# Patient Record
Sex: FEMALE | Race: WHITE | NOT HISPANIC OR LATINO
[De-identification: names, ages, dates, MRNs, and addresses within clinical notes are randomized per-mention and may not be internally consistent; named-entity substitution may affect disease eponyms.]

---

## 2021-09-25 ENCOUNTER — TRANSCRIPTION ENCOUNTER (OUTPATIENT)
Age: 56
End: 2021-09-25

## 2022-04-25 ENCOUNTER — APPOINTMENT (OUTPATIENT)
Dept: ORTHOPEDIC SURGERY | Facility: CLINIC | Age: 57
End: 2022-04-25

## 2022-05-12 ENCOUNTER — APPOINTMENT (OUTPATIENT)
Dept: ORTHOPEDIC SURGERY | Facility: CLINIC | Age: 57
End: 2022-05-12
Payer: OTHER MISCELLANEOUS

## 2022-05-12 VITALS — WEIGHT: 155 LBS | HEIGHT: 61 IN | BODY MASS INDEX: 29.27 KG/M2

## 2022-05-12 DIAGNOSIS — Z78.9 OTHER SPECIFIED HEALTH STATUS: ICD-10-CM

## 2022-05-12 PROCEDURE — 99455 WORK RELATED DISABILITY EXAM: CPT

## 2022-05-12 PROCEDURE — 99072 ADDL SUPL MATRL&STAF TM PHE: CPT

## 2022-05-16 NOTE — WORK
[Has the patient reached Maximum Medical Improvement? If yes, indicate date___] : Yes, on [unfilled] [Is there permanent partial impairment?] : Yes [FreeTextEntry6] : right middle finger\par right hand [Yes] : Yes [Right] : right [No ___________] : No: [unfilled] [FreeTextEntry7] : middle finger [FreeTextEntry8] : full [de-identified] : limited (post op) [de-identified] : trigger finger [FreeTextEntry5] : 20 [de-identified] : loss of strength- this is in addition to and separate from prior loss of use [de-identified] : hand [de-identified] : normal [de-identified] : normal [de-identified] : n/a [de-identified] : 0 [de-identified] : patient has prior loss- the carpal tunnel syndrome is included in that [Has the patient had an injury/illness since the date of injury which impacts residual functional capacity?] : No [Would the patient benefit from vocational rehabilitation? If Yes, explain below.] :  No

## 2022-05-16 NOTE — HISTORY OF PRESENT ILLNESS
[4] : 4 [3] : 3 [Intermittent] : intermittent [Rest] : rest [de-identified] :   DOI 12/28/20 RT HAND\par DOS 2/25/22 L MF TFR\par DOS 4/23/21 RT MF TF RELEASE\par \par 5/12/22: pain in B hands, would like  more OT for the LMF TF also here for SLU Right hand and R middle finger. \par \par 3/8/22: doing well post op L MF TF- better than the right post op\par 2/14/22: R MF Improving\par 10/21/21: left mf tf symptoms returned\par 10/19/2021: Pt here for f/u s/p right middle finger trigger release. Pt states that she still has mild discomfort to the\par right hand middle finger flexor tendon with no triggering noted. \par 5/04/21: POV 1- pt states her finger is stiff and is having discomfort. denies fevers/chills. \par 2/11/21: pt states the injection only helped for 2 days. smyptoms same. \par 1/12/21: Pt is unable to make a full fist. Pain with certain motions. \par 12/28/20: Pt injured her right hand at work today. [FreeTextEntry1] : right hand middle finger  [de-identified] : motion

## 2022-05-16 NOTE — IMAGING
[de-identified] : right hand- well healed scars from CTR and MF TFR\par farom, mild soreness with middle finger flexion\par NVI\par strength 5/5 throughout except  4+/5\par \par left hand well healed scar, mild stiffness of MF flexion\par NVI\par

## 2022-05-26 ENCOUNTER — APPOINTMENT (OUTPATIENT)
Dept: ORTHOPEDIC SURGERY | Facility: CLINIC | Age: 57
End: 2022-05-26

## 2022-05-26 PROCEDURE — 99072 ADDL SUPL MATRL&STAF TM PHE: CPT

## 2022-05-26 PROCEDURE — 99455 WORK RELATED DISABILITY EXAM: CPT

## 2022-05-26 NOTE — PHYSICAL EXAM
[de-identified] : Left hand MF:\par Thickening of palmar fascia at incision site\par TTP at incision site\par FAROM\par NVID\par \par Right hand MF:\par No swelling/ecchymosis\par TTP at incision site\par FAROM\par NVID

## 2022-05-26 NOTE — IMAGING
[de-identified] : right hand- well healed scars from CTR and MF TFR\par farom, mild soreness with middle finger flexion\par NVI\par strength 5/5 throughout except  4+/5\par \par left hand well healed scar, mild stiffness of MF flexion\par NVI\par

## 2022-05-26 NOTE — HISTORY OF PRESENT ILLNESS
[de-identified] : [facility]U \par WC DOI 12/28/20 RT HAND\par DOS 2/25/22 L MF TFR\par DOS 4/23/21 RT MF TF RELEASE\par \par 5/26/22:  Pt is still having pain in b/l MFs\par \par 5/12/22: pain in B hands, would like  more OT for the LMF TF also here for SLU Right hand and R middle finger. \par \par 3/8/22: doing well post op L MF TF- better than the right post op\par 2/14/22: R MF Improving\par 10/21/21: left mf tf symptoms returned\par 10/19/2021: Pt here for f/u s/p right middle finger trigger release. Pt states that she still has mild discomfort to the\par right hand middle finger flexor tendon with no triggering noted. \par 5/04/21: POV 1- pt states her finger is stiff and is having discomfort. denies fevers/chills. \par 2/11/21: pt states the injection only helped for 2 days. smyptoms same. \par 1/12/21: Pt is unable to make a full fist. Pain with certain motions. \par 12/28/20: Pt injured her right hand at work today. [6] : 6 [2] : 2 [Intermittent] : intermittent [Rest] : rest [FreeTextEntry1] : right hand middle finger  [de-identified] : motion  intact

## 2022-05-26 NOTE — WORK
[Has the patient reached Maximum Medical Improvement? If yes, indicate date___] : Yes, on [unfilled] [Is there permanent partial impairment?] : Yes [FreeTextEntry6] : right middle finger\par right hand [Yes] : Yes [Right] : right [No ___________] : No: [unfilled] [FreeTextEntry7] : middle finger [FreeTextEntry8] : full [de-identified] : limited (post op) [de-identified] : trigger finger [FreeTextEntry5] : 20 [de-identified] : loss of strength- this is in addition to and separate from prior loss of use [de-identified] : hand [de-identified] : normal [de-identified] : normal [de-identified] : n/a [de-identified] : 0 [de-identified] : patient has prior loss- the carpal tunnel syndrome is included in that [Has the patient had an injury/illness since the date of injury which impacts residual functional capacity?] : No [Would the patient benefit from vocational rehabilitation? If Yes, explain below.] :  No

## 2022-06-16 ENCOUNTER — APPOINTMENT (OUTPATIENT)
Dept: ORTHOPEDIC SURGERY | Facility: CLINIC | Age: 57
End: 2022-06-16
Payer: OTHER MISCELLANEOUS

## 2022-06-16 VITALS — WEIGHT: 155 LBS | HEIGHT: 61 IN | BODY MASS INDEX: 29.27 KG/M2

## 2022-06-16 DIAGNOSIS — M65.331 TRIGGER FINGER, RIGHT MIDDLE FINGER: ICD-10-CM

## 2022-06-16 DIAGNOSIS — Z98.890 OTHER SPECIFIED POSTPROCEDURAL STATES: ICD-10-CM

## 2022-06-16 PROCEDURE — 99072 ADDL SUPL MATRL&STAF TM PHE: CPT

## 2022-06-16 PROCEDURE — 99214 OFFICE O/P EST MOD 30 MIN: CPT

## 2022-06-16 NOTE — WORK
[Has the patient reached Maximum Medical Improvement? If yes, indicate date___] : Yes, on [unfilled] [Is there permanent partial impairment?] : Yes [Yes] : Yes [Right] : right [No ___________] : No: [unfilled] [Repetitive Strain Injury] : repetitive strain injury [Was the competent medical cause of the injury] : was the competent medical cause of the injury [Are consistent with the injury] : are consistent with the injury [Consistent with my objective findings] : consistent with my objective findings [Mild Partial] : mild partial [Does not reveal pre-existing condition(s) that may affect treatment/prognosis] : does not reveal pre-existing condition(s) that may affect treatment/prognosis [Cannot return to work because ________] : cannot return to work because [unfilled] [Pulling/Pushing] : pulling/pushing [Reaching overhead] : reaching overhead [Use of upper extremities] : use of upper extremities [Unknown at this time] : : unknown at this time [Patient] : patient [No Rx restrictions] : No Rx restrictions. [I provided the services listed above] :  I provided the services listed above. [FreeTextEntry1] : guarded [FreeTextEntry6] : right middle finger\par right hand [FreeTextEntry8] : full [FreeTextEntry7] : middle finger [de-identified] : limited (post op) [de-identified] : trigger finger [FreeTextEntry5] : 20 [de-identified] : loss of strength- this is in addition to and separate from prior loss of use [de-identified] : hand [de-identified] : normal [de-identified] : normal [de-identified] : n/a [de-identified] : 0 [de-identified] : patient has prior loss- the carpal tunnel syndrome is included in that [Has the patient had an injury/illness since the date of injury which impacts residual functional capacity?] : No [Would the patient benefit from vocational rehabilitation? If Yes, explain below.] :  No

## 2022-06-16 NOTE — ASSESSMENT
[FreeTextEntry1] : Recommend continued OT x 2 mos for the left hand for desensitization and pain relief.\par RTO in 8 weeks for f/u care.\par Pt is currently working part time light duty and will continue until further notice.\par

## 2022-06-16 NOTE — PHYSICAL EXAM
[de-identified] : Left hand MF:\par Thickening of palmar fascia at incision site/underlying soft tissue induration.\par TTP at incision site\par All digits with FAROM / NVID\par \par Right hand MF:\par No swelling/ecchymosis\par Minimal TTP at incision site\par All digits with FAROM with minimal clicking over the A1 pulley and no triggering noted.\par All digits are NVID

## 2022-06-16 NOTE — HISTORY OF PRESENT ILLNESS
[Burning] : burning [Dull/Aching] : dull/aching [Localized] : localized [Tightness] : tightness [Light duty] : Work status: light duty [6] : 6 [2] : 2 [Intermittent] : intermittent [Rest] : rest [de-identified] : [facility] \par WC DOI 12/28/20 RT HAND\par DOS 2/25/22 L MF TFR\par DOS 4/23/21 RT MF TF RELEASE\par \par 6/16/2022: Pt here with complaint of left hand pain s/p MF trigger release.\par Pt still notes left hand pain with grasping and lifting.\par Right hand is largely asymptomatic (other than minimal clicking of the MF with no actual clicking). \par Pt denies n/t to either upper extremity. \par \par 5/26/22:  Pt is still having pain in b/l MFs\par \par 5/12/22: pain in B hands, would like  more OT for the LMF TF also here for SLU Right hand and R middle finger. \par \par 3/8/22: doing well post op L MF TF- better than the right post op\par 2/14/22: R MF Improving\par 10/21/21: left mf tf symptoms returned\par 10/19/2021: Pt here for f/u s/p right middle finger trigger release. Pt states that she still has mild discomfort to the\par right hand middle finger flexor tendon with no triggering noted. \par 5/04/21: POV 1- pt states her finger is stiff and is having discomfort. denies fevers/chills. \par 2/11/21: pt states the injection only helped for 2 days. smyptoms same. \par 1/12/21: Pt is unable to make a full fist. Pain with certain motions. \par 12/28/20: Pt injured her right hand at work today. [] : Post Surgical Visit: no [FreeTextEntry1] : right hand middle finger  [FreeTextEntry6] : stiffness, swelling [de-identified] : motion  [de-identified] : none

## 2022-06-16 NOTE — IMAGING
[de-identified] : right hand- well healed scars from CTR and MF TFR\par farom, mild soreness with middle finger flexion\par NVI\par strength 5/5 throughout except  4+/5\par \par left hand well healed scar, mild stiffness of MF flexion\par NVI\par

## 2022-09-13 ENCOUNTER — APPOINTMENT (OUTPATIENT)
Dept: ORTHOPEDIC SURGERY | Facility: CLINIC | Age: 57
End: 2022-09-13

## 2022-09-13 VITALS — HEIGHT: 61 IN | BODY MASS INDEX: 29.27 KG/M2 | WEIGHT: 155 LBS

## 2022-09-13 DIAGNOSIS — M65.342 TRIGGER FINGER, LEFT RING FINGER: ICD-10-CM

## 2022-09-13 PROCEDURE — 99455 WORK RELATED DISABILITY EXAM: CPT

## 2022-09-13 PROCEDURE — 99072 ADDL SUPL MATRL&STAF TM PHE: CPT

## 2022-09-20 NOTE — HISTORY OF PRESENT ILLNESS
[6] : 6 [2] : 2 [Burning] : burning [Dull/Aching] : dull/aching [Localized] : localized [Tightness] : tightness [Intermittent] : intermittent [Rest] : rest [Light duty] : Work status: light duty [de-identified] : [facility] - left middle and ring finger\par WC DOI 12/28/20 RT HAND\par DOS 2/25/22 L MF TFR\par DOS 4/23/21 RT MF TF RELEASE\par \par 9/13/22\par \par 6/16/2022: Pt here with complaint of left hand pain s/p MF trigger release.\par Pt still notes left hand pain with grasping and lifting.\par Right hand is largely asymptomatic (other than minimal clicking of the MF with no actual clicking). \par Pt denies n/t to either upper extremity. \par \par 5/26/22:  Pt is still having pain in b/l MFs\par \par 5/12/22: pain in B hands, would like  more OT for the LMF TF also here for SLU Right hand and R middle finger. \par \par 3/8/22: doing well post op L MF TF- better than the right post op\par 2/14/22: R MF Improving\par 10/21/21: left mf tf symptoms returned\par 10/19/2021: Pt here for f/u s/p right middle finger trigger release. Pt states that she still has mild discomfort to the\par right hand middle finger flexor tendon with no triggering noted. \par 5/04/21: POV 1- pt states her finger is stiff and is having discomfort. denies fevers/chills. \par 2/11/21: pt states the injection only helped for 2 days. smyptoms same. \par 1/12/21: Pt is unable to make a full fist. Pain with certain motions. \par 12/28/20: Pt injured her right hand at work today. [] : Post Surgical Visit: no [FreeTextEntry1] : right hand middle finger  [FreeTextEntry6] : stiffness, swelling [de-identified] : motion  [de-identified] : none

## 2022-09-20 NOTE — PHYSICAL EXAM
[de-identified] : Left hand MF:\par Thickening of palmar fascia at incision site/underlying soft tissue induration.\par TTP at incision site\par MF tip to DPC .5cm, RF tip to DPC 0cm, other fingers farom\par \par Right hand MF:\par No swelling/ecchymosis\par Minimal TTP at incision site\par All digits with FAROM with minimal clicking over the A1 pulley and no triggering noted.\par All digits are NVID

## 2022-09-20 NOTE — IMAGING
[de-identified] : right hand- well healed scars from CTR and MF TFR\par farom, mild soreness with middle finger flexion\par NVI\par strength 5/5 throughout except  4+/5\par \par left hand well healed scar, mild stiffness of MF flexion\par NVI\par

## 2022-09-20 NOTE — WORK
[Has the patient reached Maximum Medical Improvement? If yes, indicate date___] : Yes, on [unfilled] [Is there permanent partial impairment?] : Yes [Yes] : Yes [No ___________] : No: [unfilled] [FreeTextEntry6] : left middle finger, left ring finger [Left] : left [FreeTextEntry7] : middle finger [FreeTextEntry8] : tip to DPC .5cm [de-identified] : limited (post op) [de-identified] : trigger finger [FreeTextEntry5] : 20 [de-identified] : ring finger [de-identified] : normal [de-identified] : normal [de-identified] : trigger finger [de-identified] : 10 [Has the patient had an injury/illness since the date of injury which impacts residual functional capacity?] : No [Would the patient benefit from vocational rehabilitation? If Yes, explain below.] :  No

## 2022-12-05 ENCOUNTER — EMERGENCY (EMERGENCY)
Facility: HOSPITAL | Age: 57
LOS: 1 days | Discharge: HOME | End: 2022-12-05
Attending: STUDENT IN AN ORGANIZED HEALTH CARE EDUCATION/TRAINING PROGRAM | Admitting: STUDENT IN AN ORGANIZED HEALTH CARE EDUCATION/TRAINING PROGRAM

## 2022-12-05 VITALS
OXYGEN SATURATION: 100 % | DIASTOLIC BLOOD PRESSURE: 76 MMHG | RESPIRATION RATE: 18 BRPM | SYSTOLIC BLOOD PRESSURE: 129 MMHG | TEMPERATURE: 98 F | HEART RATE: 80 BPM

## 2022-12-05 VITALS
OXYGEN SATURATION: 100 % | SYSTOLIC BLOOD PRESSURE: 132 MMHG | RESPIRATION RATE: 17 BRPM | HEART RATE: 86 BPM | DIASTOLIC BLOOD PRESSURE: 61 MMHG | TEMPERATURE: 98 F

## 2022-12-05 LAB
ALBUMIN SERPL ELPH-MCNC: 4.5 G/DL — SIGNIFICANT CHANGE UP (ref 3.3–5)
ALP SERPL-CCNC: 118 U/L — SIGNIFICANT CHANGE UP (ref 40–120)
ALT FLD-CCNC: 20 U/L — SIGNIFICANT CHANGE UP (ref 4–33)
ANION GAP SERPL CALC-SCNC: 15 MMOL/L — HIGH (ref 7–14)
AST SERPL-CCNC: 20 U/L — SIGNIFICANT CHANGE UP (ref 4–32)
BASE EXCESS BLDV CALC-SCNC: 1.5 MMOL/L — SIGNIFICANT CHANGE UP (ref -2–3)
BASE EXCESS BLDV CALC-SCNC: 1.5 MMOL/L — SIGNIFICANT CHANGE UP (ref -2–3)
BASOPHILS # BLD AUTO: 0.06 K/UL — SIGNIFICANT CHANGE UP (ref 0–0.2)
BASOPHILS NFR BLD AUTO: 0.6 % — SIGNIFICANT CHANGE UP (ref 0–2)
BILIRUB SERPL-MCNC: <0.2 MG/DL — SIGNIFICANT CHANGE UP (ref 0.2–1.2)
BLOOD GAS VENOUS COMPREHENSIVE RESULT: SIGNIFICANT CHANGE UP
BUN SERPL-MCNC: 16 MG/DL — SIGNIFICANT CHANGE UP (ref 7–23)
CALCIUM SERPL-MCNC: 9.2 MG/DL — SIGNIFICANT CHANGE UP (ref 8.4–10.5)
CHLORIDE BLDV-SCNC: 106 MMOL/L — SIGNIFICANT CHANGE UP (ref 96–108)
CHLORIDE SERPL-SCNC: 103 MMOL/L — SIGNIFICANT CHANGE UP (ref 98–107)
CO2 BLDV-SCNC: 29.2 MMOL/L — HIGH (ref 22–26)
CO2 SERPL-SCNC: 23 MMOL/L — SIGNIFICANT CHANGE UP (ref 22–31)
COHGB MFR BLDV: 1 % — SIGNIFICANT CHANGE UP
CREAT SERPL-MCNC: 0.64 MG/DL — SIGNIFICANT CHANGE UP (ref 0.5–1.3)
EGFR: 102 ML/MIN/1.73M2 — SIGNIFICANT CHANGE UP
EOSINOPHIL # BLD AUTO: 0.37 K/UL — SIGNIFICANT CHANGE UP (ref 0–0.5)
EOSINOPHIL NFR BLD AUTO: 3.5 % — SIGNIFICANT CHANGE UP (ref 0–6)
GAS PNL BLDV: 138 MMOL/L — SIGNIFICANT CHANGE UP (ref 136–145)
GLUCOSE BLDV-MCNC: 113 MG/DL — HIGH (ref 70–99)
GLUCOSE SERPL-MCNC: 113 MG/DL — HIGH (ref 70–99)
HCO3 BLDV-SCNC: 28 MMOL/L — SIGNIFICANT CHANGE UP (ref 22–29)
HCO3 BLDV-SCNC: 28 MMOL/L — SIGNIFICANT CHANGE UP (ref 22–29)
HCT VFR BLD CALC: 39 % — SIGNIFICANT CHANGE UP (ref 34.5–45)
HCT VFR BLDA CALC: 39 % — SIGNIFICANT CHANGE UP (ref 34.5–46.5)
HGB BLD CALC-MCNC: 12.8 G/DL — SIGNIFICANT CHANGE UP (ref 11.5–15.5)
HGB BLD CALC-MCNC: SIGNIFICANT CHANGE UP G/DL (ref 11.5–15.5)
HGB BLD-MCNC: 12.5 G/DL — SIGNIFICANT CHANGE UP (ref 11.5–15.5)
IANC: 4.91 K/UL — SIGNIFICANT CHANGE UP (ref 1.8–7.4)
IMM GRANULOCYTES NFR BLD AUTO: 0.2 % — SIGNIFICANT CHANGE UP (ref 0–0.9)
LACTATE BLDV-MCNC: 1.9 MMOL/L — SIGNIFICANT CHANGE UP (ref 0.5–2)
LYMPHOCYTES # BLD AUTO: 4.44 K/UL — HIGH (ref 1–3.3)
LYMPHOCYTES # BLD AUTO: 42.2 % — SIGNIFICANT CHANGE UP (ref 13–44)
MCHC RBC-ENTMCNC: 27.4 PG — SIGNIFICANT CHANGE UP (ref 27–34)
MCHC RBC-ENTMCNC: 32.1 GM/DL — SIGNIFICANT CHANGE UP (ref 32–36)
MCV RBC AUTO: 85.3 FL — SIGNIFICANT CHANGE UP (ref 80–100)
METHGB MFR BLDV: 0.3 % — SIGNIFICANT CHANGE UP (ref 0–1.5)
MONOCYTES # BLD AUTO: 0.73 K/UL — SIGNIFICANT CHANGE UP (ref 0–0.9)
MONOCYTES NFR BLD AUTO: 6.9 % — SIGNIFICANT CHANGE UP (ref 2–14)
NEUTROPHILS # BLD AUTO: 4.91 K/UL — SIGNIFICANT CHANGE UP (ref 1.8–7.4)
NEUTROPHILS NFR BLD AUTO: 46.6 % — SIGNIFICANT CHANGE UP (ref 43–77)
NRBC # BLD: 0 /100 WBCS — SIGNIFICANT CHANGE UP (ref 0–0)
NRBC # FLD: 0 K/UL — SIGNIFICANT CHANGE UP (ref 0–0)
PCO2 BLDV: 49 MMHG — HIGH (ref 39–42)
PCO2 BLDV: 49 MMHG — HIGH (ref 39–42)
PH BLDV: 7.36 — SIGNIFICANT CHANGE UP (ref 7.32–7.43)
PH BLDV: 7.36 — SIGNIFICANT CHANGE UP (ref 7.32–7.43)
PLATELET # BLD AUTO: 399 K/UL — SIGNIFICANT CHANGE UP (ref 150–400)
PO2 BLDV: 40 MMHG — SIGNIFICANT CHANGE UP
PO2 BLDV: 40 MMHG — SIGNIFICANT CHANGE UP
POTASSIUM BLDV-SCNC: 3.8 MMOL/L — SIGNIFICANT CHANGE UP (ref 3.5–5.1)
POTASSIUM SERPL-MCNC: 4 MMOL/L — SIGNIFICANT CHANGE UP (ref 3.5–5.3)
POTASSIUM SERPL-SCNC: 4 MMOL/L — SIGNIFICANT CHANGE UP (ref 3.5–5.3)
PROT SERPL-MCNC: 7.9 G/DL — SIGNIFICANT CHANGE UP (ref 6–8.3)
RBC # BLD: 4.57 M/UL — SIGNIFICANT CHANGE UP (ref 3.8–5.2)
RBC # FLD: 14.1 % — SIGNIFICANT CHANGE UP (ref 10.3–14.5)
SAO2 % BLDV: 62.6 % — SIGNIFICANT CHANGE UP
SAO2 % BLDV: SIGNIFICANT CHANGE UP %
SODIUM SERPL-SCNC: 141 MMOL/L — SIGNIFICANT CHANGE UP (ref 135–145)
WBC # BLD: 10.53 K/UL — HIGH (ref 3.8–10.5)
WBC # FLD AUTO: 10.53 K/UL — HIGH (ref 3.8–10.5)

## 2022-12-05 PROCEDURE — 99285 EMERGENCY DEPT VISIT HI MDM: CPT

## 2022-12-05 NOTE — ED PROVIDER NOTE - NSFOLLOWUPINSTRUCTIONS_ED_ALL_ED_FT
Thank you for visiting our Emergency Department, it has been a pleasure taking part in your healthcare.    Your discharge diagnosis is: exposure to carbon monoxide   Please take all discharge medications as indicated below:  Take Motrin/Tylenol for pain as needed, please follow instructions on manufacturers label. If you have any questions please consult a pharmacist or your PMD.  Please follow up with your PMD within x48 hours.  A copy of resulted labs, imaging, and findings have been provided to you.   You have had a detailed discussion with your provider regarding your diagnosis, care management and discharge planning including, but not limited to: return precautions, follow up visits with existing or new providers, new prescriptions and/or medication changes, wound and/or splint/cast care or other care   aspects specific to your diagnosis and treatment. You have been given the opportunity to have your questions answered. At this time you have been deemed stable and fit for discharge.  Return precautions to the Emergency Department include but are not limited to: unrelenting nausea, vomiting, fever, chills, chest pain, shortness of breath, dizziness, chest or abdominal pain, worsening back pain, syncope, blood in urine or stool, headache that doesn't resolve, numbness or tingling, loss of sensation, loss of motor function, or any other concerning symptoms.    PLEASE DO NOT RETURN HOME UNTIL CLEARED BY DEPT OF HEALTH AND/OR LOCAL FIRE DEPARTMENT

## 2022-12-05 NOTE — ED PROVIDER NOTE - PATIENT PORTAL LINK FT
You can access the FollowMyHealth Patient Portal offered by [facility] by registering at the following website: [email or web address]. By joining [employer]’s FollowMyHealth portal, you will also be able to view your health information using other applications (apps) compatible with our system.

## 2022-12-05 NOTE — ED PROVIDER NOTE - NS ED ATTENDING STATEMENT MOD
lac over eyebrow - plastic, suture, reassess This was a shared visit with the NEFTALI. I reviewed and verified the documentation and independently performed the documented:

## 2022-12-05 NOTE — ED PROVIDER NOTE - OBJECTIVE STATEMENT
58 yo female with no significant pmhx presets to the ED with possible CO2 exposure. pt states her boiler had an issue where she noted to have elevated CO2 levels at home. she states the [employer] was contacted on Friday where she was assessed and the house was cleared. pt states she has been staying in the same house, however the boiler has been off. pt states she has been having HA and lightheaded. pt denies LOC or AMS episodes.

## 2022-12-05 NOTE — ED ADULT NURSE NOTE - OBJECTIVE STATEMENT
PT is reporting to the ED with daughter for possible Carbon monoxide exposure. Reports over the weekend, headache starting. States that FD came to house due to gas smell and broken boiler, FD turned off boiler and  came. Pt advised to not go to home until issue with the boiler is resolved. PT understands risk. MD at bedside educating pt. Labs sent.

## 2022-12-05 NOTE — ED PROVIDER NOTE - ATTENDING APP SHARED VISIT CONTRIBUTION OF CARE
I have personally performed a face to face medical and diagnostic evaluation of the patient. I have discussed with and reviewed the NEFTALI's note and agree with the History, ROS, Physical Exam and MDM unless otherwise indicated. A brief summary of my personal evaluation and impression can be found below.    Osbaldo CUELLAR:  57-year-old female no past medical history former smoker presents with a chief complaint of concern for possible carbon monoxide exposure.  Patient reports in her home there is an old boiler that is suspected to have a CO leak. Over the last few weeks pt has been having intermittent episodes of headache nausea and not feeling like herself patient states that 4 days ago found to have elevated carbon monoxide levels in home [place]  house was ventilated and cleared boiler was identified to be the problem and she had a repair man to attend to the boiler.  Once the boiler returned back on the carbon monoxide levels spiked in the house again.  Patient still having from persistent symptoms was in house earlier today came to ED for eval.  Notes mild nausea lightheadedness no vomiting and mild headaches.  Otherwise no complaints can move everything and feel everything no chest pain no shortness of breath no fever no changes in vision. Pt was in home again today. Patient's daughter is being evaluated in ED for same complaint.    All other ROS negative, except as above and as per HPI and ROS section.    VITALS: Initial triage and subsequent vitals have been reviewed by me.  GEN APPEARANCE: WDWN, alert, non-toxic, NAD  HEAD: Atraumatic.  EYES: PERRLa, EOMI, vision grossly intact.   NECK: Supple  CV: RRR, S1S2, no c/r/m/g. Cap refill <2 seconds. No bruits.   LUNGS: CTAB. No abnormal breath sounds.  ABDOMEN: Soft, NTND. No guarding or rebound.   MSK/EXT: No spinal or extremity point tenderness. No CVA ttp. Pelvis stable. No peripheral edema.  NEURO: Alert, follows commands. Weight bearing normal. Speech normal. Sensation and motor normal x4 extremities. CN2-12 normal, coordination normal, ambulating normally. UE & LE 5/5 b/l.  SKIN: Warm, dry and intact. No rash.  PSYCH: Appropriate    Plan/MDM: Osbaldo CUELLAR:  57-year-old female no past medical history former smoker presents with a chief complaint of concern for possible carbon monoxide exposure.  Patient reports in her home there is an old boiler that is suspected to have a CO leak. Over the last few weeks pt has been having intermittent episodes of headache nausea and not feeling like herself patient states that 4 days ago found to have elevated carbon monoxide levels in home [place]  house was ventilated and cleared boiler was identified to be the problem and she had a repair man to attend to the boiler.  Once the boiler returned back on the carbon monoxide levels spiked in the house again.  Patient still having from persistent symptoms was in house earlier today came to ED for eval.  Notes mild nausea lightheadedness no vomiting and mild headaches.   Exam vital signs stable nontoxic and well-appearing exam is grossly nonfocal DDx concern for possible for possible carbon monoxide exposure plan to check labs and will get co-ox studies.  Dispo accordingly pending results.  Patient was informed to not return to home until boiler is repaired and is cleared by both fire department and or department of health.  Strict return precautions were discussed.

## 2023-04-12 ENCOUNTER — NON-APPOINTMENT (OUTPATIENT)
Age: 58
End: 2023-04-12

## 2023-04-13 ENCOUNTER — NON-APPOINTMENT (OUTPATIENT)
Age: 58
End: 2023-04-13

## 2023-04-14 ENCOUNTER — APPOINTMENT (OUTPATIENT)
Dept: ORTHOPEDIC SURGERY | Facility: CLINIC | Age: 58
End: 2023-04-14
Payer: COMMERCIAL

## 2023-04-14 DIAGNOSIS — M79.671 PAIN IN RIGHT FOOT: ICD-10-CM

## 2023-04-14 PROCEDURE — 99214 OFFICE O/P EST MOD 30 MIN: CPT

## 2023-04-14 PROCEDURE — 73630 X-RAY EXAM OF FOOT: CPT | Mod: RT

## 2023-04-24 ENCOUNTER — APPOINTMENT (OUTPATIENT)
Dept: ORTHOPEDIC SURGERY | Facility: CLINIC | Age: 58
End: 2023-04-24
Payer: OTHER MISCELLANEOUS

## 2023-04-24 VITALS — HEIGHT: 61 IN | BODY MASS INDEX: 29.27 KG/M2 | WEIGHT: 155 LBS

## 2023-04-24 DIAGNOSIS — M65.332 TRIGGER FINGER, LEFT MIDDLE FINGER: ICD-10-CM

## 2023-04-24 PROCEDURE — 99455 WORK RELATED DISABILITY EXAM: CPT

## 2023-05-02 NOTE — WORK
[Has the patient reached Maximum Medical Improvement? If yes, indicate date___] : Yes, on [unfilled] [Is there permanent partial impairment?] : Yes [Left] : left [Yes] : Yes [FreeTextEntry6] : left middle finger [FreeTextEntry7] : middle finger [FreeTextEntry8] : tip to DPC .5cm [de-identified] : limited (post op) [de-identified] : trigger finger [FreeTextEntry5] : 20 [Has the patient had an injury/illness since the date of injury which impacts residual functional capacity?] : No [Would the patient benefit from vocational rehabilitation? If Yes, explain below.] :  No

## 2023-05-02 NOTE — IMAGING
[de-identified] : right hand- well healed scars from CTR and MF TFR\par farom, mild soreness/stiffness with middle finger flexion\par NVI\par strength 5/5 throughout.\par Still with ttp over the A1 pulley with no triggering.\par \par left hand, well healed scar from L MF TFR\par mp 0-90, pip 0-90, dip 0-80\par

## 2023-05-02 NOTE — HISTORY OF PRESENT ILLNESS
[6] : 6 [2] : 2 [Burning] : burning [Dull/Aching] : dull/aching [Localized] : localized [Tightness] : tightness [Intermittent] : intermittent [Rest] : rest [Light duty] : Work status: light duty [de-identified] : [facility] - left middle finger\par WC DOI 12/28/20 RT HAND\par DOS 2/25/22 L MF TFR\par DOS 4/23/21 RT MF TF RELEASE\par \par 4/24/2023: SLU for left middle finger. \par \par 9/13/22: Pt here for left hand s/p MF trigger release.\par \par 6/16/2022: Pt here with complaint of left hand pain s/p MF trigger release.\par Pt still notes left hand pain with grasping and lifting.\par Right hand is largely asymptomatic (other than minimal clicking of the MF with no actual clicking). \par Pt denies n/t to either upper extremity. \par \par 5/26/22:  Pt is still having pain in b/l MFs\par \par 5/12/22: pain in B hands, would like  more OT for the LMF TF also here for SLU Right hand and R middle finger. \par \par 3/8/22: doing well post op L MF TF- better than the right post op\par 2/14/22: R MF Improving\par 10/21/21: left mf tf symptoms returned\par 10/19/2021: Pt here for f/u s/p right middle finger trigger release. Pt states that she still has mild discomfort to the\par right hand middle finger flexor tendon with no triggering noted. \par 5/04/21: POV 1- pt states her finger is stiff and is having discomfort. denies fevers/chills. \par 2/11/21: pt states the injection only helped for 2 days. smyptoms same. \par 1/12/21: Pt is unable to make a full fist. Pain with certain motions. \par 12/28/20: Pt injured her right hand at work today. [] : Post Surgical Visit: no [FreeTextEntry1] : right hand middle finger  [FreeTextEntry6] : stiffness, swelling [de-identified] : motion  [de-identified] : none

## 2023-08-09 ENCOUNTER — APPOINTMENT (OUTPATIENT)
Dept: ORTHOPEDIC SURGERY | Facility: CLINIC | Age: 58
End: 2023-08-09
Payer: COMMERCIAL

## 2023-08-09 PROCEDURE — 73630 X-RAY EXAM OF FOOT: CPT | Mod: RT

## 2023-08-09 PROCEDURE — 99214 OFFICE O/P EST MOD 30 MIN: CPT

## 2023-08-09 NOTE — ASSESSMENT
[FreeTextEntry1] : Discussed treatment options, both operative and non-operative. Elective SIDNEY was discussed.  Recommend CT RT foot to evaluate and plan for surgery.  The risks and benefits of surgery have been discussed. Risks include but are not limited to bleeding, infection, reaction to anesthesia, injury to blood vessels and nerves, malunion, nonunion, necessity of repeat surgery, chronic pain, DVT, PE, loss of limb and death. The patient understands the risks and agrees with the surgical plan. Details of the procedure and postoperative protocol were discussed at length. All questions have been answered.  She opts for surgery and will be scheduled at her convenience.

## 2023-08-09 NOTE — PHYSICAL EXAM
[NL (40)] : plantar flexion 40 degrees [NL 30)] : inversion 30 degrees [NL (20)] : eversion 20 degrees [2+] : posterior tibialis pulse: 2+ [Normal] : saphenous nerve sensation normal [1st] : 1st [5___] : [facility] 5[unfilled]/5 [4___] : [facility] 4[unfilled]/5 [Right] : right foot [Weight -] : weightbearing [] : no pain when stressing lateral tarsal metatarsal joint [de-identified] : Decreased sensation medial hallux. [de-identified] : Evidence of bunionectomy with two screws in 1st metatarsal, one is potentially proud and entering 1st MTP joint.

## 2023-08-09 NOTE — HISTORY OF PRESENT ILLNESS
[9] : 9 [5] : 5 [Burning] : burning [Stabbing] : stabbing [Meds] : meds [Ice] : ice [Standing] : standing [Walking] : walking [de-identified] : Pt is a 58 year old female who presents today for evaluation of her right foot. She is s/p RT bunionectomy 2/22/2023 by Dr. Angela DPM. She was evaluated by Dr. Huber who recommended removal of hardware. She is doing PT 2-3x a week. Pain localized along the top of foot. WB in sneakers. She presents today for a second opinion.  [FreeTextEntry1] : right foot

## 2023-08-16 ENCOUNTER — APPOINTMENT (OUTPATIENT)
Dept: CT IMAGING | Facility: CLINIC | Age: 58
End: 2023-08-16
Payer: COMMERCIAL

## 2023-08-16 PROCEDURE — 73700 CT LOWER EXTREMITY W/O DYE: CPT | Mod: RT

## 2023-08-16 PROCEDURE — 76376 3D RENDER W/INTRP POSTPROCES: CPT | Mod: RT

## 2023-08-18 ENCOUNTER — APPOINTMENT (OUTPATIENT)
Dept: ORTHOPEDIC SURGERY | Facility: CLINIC | Age: 58
End: 2023-08-18
Payer: COMMERCIAL

## 2023-08-18 PROCEDURE — 99214 OFFICE O/P EST MOD 30 MIN: CPT

## 2023-08-18 NOTE — PHYSICAL EXAM
[1st] : 1st [NL (40)] : plantar flexion 40 degrees [NL 30)] : inversion 30 degrees [NL (20)] : eversion 20 degrees [5___] : [facility] 5[unfilled]/5 [4___] : [facility] 4[unfilled]/5 [2+] : posterior tibialis pulse: 2+ [Normal] : saphenous nerve sensation normal [Right] : right foot [Weight -] : weightbearing [] : no pain when stressing lateral tarsal metatarsal joint [de-identified] : Decreased sensation medial hallux. [de-identified] : Evidence of bunionectomy with two screws in 1st metatarsal, one is potentially proud and entering 1st MTP joint.

## 2023-08-18 NOTE — HISTORY OF PRESENT ILLNESS
[9] : 9 [5] : 5 [Burning] : burning [Stabbing] : stabbing [Meds] : meds [Ice] : ice [Standing] : standing [Walking] : walking [de-identified] : Pt is a 58 year old female who presents today for f/u of her right foot. She is s/p RT bunionectomy 2/22/2023 by Dr. Angela DPM. She was evaluated by Dr. Huber who recommended removal of hardware. She is doing PT 2-3x a week. Pain localized along the top of foot. WB in sneakers. She went for CT scan which shows hardware entering 1st MTP joint.  [FreeTextEntry1] : right foot

## 2023-08-18 NOTE — ASSESSMENT
[FreeTextEntry1] : Discussed treatment options, both operative and non-operative. Elective SIDNEY was discussed and is recommended.  The risks and benefits of surgery have been discussed. Risks include but are not limited to bleeding, infection, reaction to anesthesia, injury to blood vessels and nerves, malunion, nonunion, necessity of repeat surgery, chronic pain, DVT, PE, loss of limb and death. The patient understands the risks and agrees with the surgical plan. Details of the procedure and postoperative protocol were discussed at length. All questions have been answered.  She opts for surgery and will be scheduled at her convenience.

## 2023-08-18 NOTE — DATA REVIEWED
[CT Scan] : CT scan [Right] : of the right [Foot] : foot [Report was reviewed and noted in the chart] : The report was reviewed and noted in the chart [I independently reviewed and interpreted images and report] : I independently reviewed and interpreted images and report [FreeTextEntry1] : 1. Postoperative changes in the first metatarsal with the medial screw extending into the first MTP joint along the plantar medial aspect without gross malalignment. 2. 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 3. Slight plantar calcaneal bone spurs and type 1 accessory navicular bone with small bone islands in the distal talus in the visualized hindfoot.

## 2023-09-14 ENCOUNTER — APPOINTMENT (OUTPATIENT)
Dept: ORTHOPEDIC SURGERY | Facility: CLINIC | Age: 58
End: 2023-09-14
Payer: COMMERCIAL

## 2023-09-14 ENCOUNTER — TRANSCRIPTION ENCOUNTER (OUTPATIENT)
Age: 58
End: 2023-09-14

## 2023-09-14 DIAGNOSIS — M20.11 HALLUX VALGUS (ACQUIRED), RIGHT FOOT: ICD-10-CM

## 2023-09-14 PROCEDURE — 99213 OFFICE O/P EST LOW 20 MIN: CPT

## 2023-09-15 PROBLEM — M20.11 HALLUX VALGUS OF RIGHT FOOT: Status: ACTIVE | Noted: 2023-04-14

## 2023-09-18 RX ORDER — OXYCODONE 5 MG/1
5 TABLET ORAL
Qty: 30 | Refills: 0 | Status: ACTIVE | COMMUNITY
Start: 2023-09-18

## 2023-09-19 ENCOUNTER — APPOINTMENT (OUTPATIENT)
Age: 58
End: 2023-09-19
Payer: COMMERCIAL

## 2023-09-19 PROCEDURE — 20680 REMOVAL OF IMPLANT DEEP: CPT | Mod: RT

## 2023-09-19 PROCEDURE — 73630 X-RAY EXAM OF FOOT: CPT | Mod: 26,RT

## 2023-09-29 ENCOUNTER — APPOINTMENT (OUTPATIENT)
Dept: ORTHOPEDIC SURGERY | Facility: CLINIC | Age: 58
End: 2023-09-29
Payer: COMMERCIAL

## 2023-09-29 DIAGNOSIS — Z00.00 ENCOUNTER FOR GENERAL ADULT MEDICAL EXAMINATION W/OUT ABNORMAL FINDINGS: ICD-10-CM

## 2023-09-29 PROCEDURE — 73630 X-RAY EXAM OF FOOT: CPT | Mod: RT

## 2023-09-29 PROCEDURE — 99024 POSTOP FOLLOW-UP VISIT: CPT

## 2023-10-12 ENCOUNTER — APPOINTMENT (OUTPATIENT)
Dept: ORTHOPEDIC SURGERY | Facility: CLINIC | Age: 58
End: 2023-10-12
Payer: OTHER MISCELLANEOUS

## 2023-10-12 VITALS — WEIGHT: 165 LBS | BODY MASS INDEX: 31.15 KG/M2 | HEIGHT: 61 IN

## 2023-10-12 DIAGNOSIS — R73.03 PREDIABETES.: ICD-10-CM

## 2023-10-12 DIAGNOSIS — Z63.5 DISRUPTION OF FAMILY BY SEPARATION AND DIVORCE: ICD-10-CM

## 2023-10-12 DIAGNOSIS — Z87.891 PERSONAL HISTORY OF NICOTINE DEPENDENCE: ICD-10-CM

## 2023-10-12 PROCEDURE — 99214 OFFICE O/P EST MOD 30 MIN: CPT | Mod: 24

## 2023-10-12 RX ORDER — DIAZEPAM 5 MG/1
5 TABLET ORAL
Qty: 1 | Refills: 0 | Status: ACTIVE | COMMUNITY
Start: 2023-10-12 | End: 1900-01-01

## 2023-10-12 SDOH — SOCIAL STABILITY - SOCIAL INSECURITY: DISRUPTION OF FAMILY BY SEPARATION AND DIVORCE: Z63.5

## 2023-10-18 ENCOUNTER — APPOINTMENT (OUTPATIENT)
Dept: ORTHOPEDIC SURGERY | Facility: CLINIC | Age: 58
End: 2023-10-18
Payer: OTHER MISCELLANEOUS

## 2023-10-18 PROCEDURE — 99215 OFFICE O/P EST HI 40 MIN: CPT | Mod: 24

## 2023-10-18 RX ORDER — CYCLOBENZAPRINE HYDROCHLORIDE 5 MG/1
5 TABLET, FILM COATED ORAL
Qty: 90 | Refills: 2 | Status: ACTIVE | COMMUNITY
Start: 2023-10-18 | End: 1900-01-01

## 2023-10-18 RX ORDER — IBUPROFEN 600 MG/1
600 TABLET, FILM COATED ORAL TWICE DAILY
Qty: 60 | Refills: 0 | Status: ACTIVE | COMMUNITY
Start: 2023-10-18 | End: 1900-01-01

## 2023-10-25 ENCOUNTER — APPOINTMENT (OUTPATIENT)
Dept: NEUROLOGY | Facility: CLINIC | Age: 58
End: 2023-10-25

## 2023-10-26 ENCOUNTER — APPOINTMENT (OUTPATIENT)
Dept: ORTHOPEDIC SURGERY | Facility: CLINIC | Age: 58
End: 2023-10-26
Payer: OTHER MISCELLANEOUS

## 2023-10-26 VITALS — HEIGHT: 61 IN | BODY MASS INDEX: 31.15 KG/M2 | WEIGHT: 165 LBS

## 2023-10-26 PROCEDURE — 99214 OFFICE O/P EST MOD 30 MIN: CPT | Mod: 24

## 2023-10-27 ENCOUNTER — APPOINTMENT (OUTPATIENT)
Dept: ORTHOPEDIC SURGERY | Facility: CLINIC | Age: 58
End: 2023-10-27
Payer: COMMERCIAL

## 2023-10-27 DIAGNOSIS — Z98.890 OTHER SPECIFIED POSTPROCEDURAL STATES: ICD-10-CM

## 2023-10-27 DIAGNOSIS — T84.84XA PAIN DUE TO INTERNAL ORTHOPEDIC PROSTHETIC DEVICES, IMPLANTS AND GRAFTS, INITIAL ENCOUNTER: ICD-10-CM

## 2023-10-27 PROCEDURE — 99024 POSTOP FOLLOW-UP VISIT: CPT

## 2023-10-27 RX ORDER — MELOXICAM 15 MG/1
15 TABLET ORAL
Qty: 30 | Refills: 0 | Status: ACTIVE | COMMUNITY
Start: 2023-10-27 | End: 1900-01-01

## 2023-11-01 ENCOUNTER — APPOINTMENT (OUTPATIENT)
Dept: ORTHOPEDIC SURGERY | Facility: CLINIC | Age: 58
End: 2023-11-01
Payer: OTHER MISCELLANEOUS

## 2023-11-01 VITALS — BODY MASS INDEX: 31.15 KG/M2 | WEIGHT: 165 LBS | HEIGHT: 61 IN

## 2023-11-01 PROCEDURE — 99214 OFFICE O/P EST MOD 30 MIN: CPT | Mod: 24

## 2023-11-01 PROCEDURE — 73030 X-RAY EXAM OF SHOULDER: CPT | Mod: RT

## 2023-11-01 PROCEDURE — 73010 X-RAY EXAM OF SHOULDER BLADE: CPT | Mod: RT

## 2023-11-08 ENCOUNTER — APPOINTMENT (OUTPATIENT)
Dept: NEUROLOGY | Facility: CLINIC | Age: 58
End: 2023-11-08
Payer: OTHER MISCELLANEOUS

## 2023-11-08 PROCEDURE — 95912 NRV CNDJ TEST 11-12 STUDIES: CPT

## 2023-11-08 PROCEDURE — 95886 MUSC TEST DONE W/N TEST COMP: CPT

## 2023-11-17 ENCOUNTER — APPOINTMENT (OUTPATIENT)
Dept: ORTHOPEDIC SURGERY | Facility: CLINIC | Age: 58
End: 2023-11-17
Payer: OTHER MISCELLANEOUS

## 2023-11-17 ENCOUNTER — APPOINTMENT (OUTPATIENT)
Dept: ORTHOPEDIC SURGERY | Facility: CLINIC | Age: 58
End: 2023-11-17

## 2023-11-17 ENCOUNTER — OUTPATIENT (OUTPATIENT)
Dept: OUTPATIENT SERVICES | Facility: HOSPITAL | Age: 58
LOS: 1 days | End: 2023-11-17

## 2023-11-17 VITALS — WEIGHT: 165 LBS | HEIGHT: 61 IN | BODY MASS INDEX: 31.15 KG/M2

## 2023-11-17 VITALS
SYSTOLIC BLOOD PRESSURE: 133 MMHG | WEIGHT: 164.91 LBS | HEART RATE: 68 BPM | DIASTOLIC BLOOD PRESSURE: 81 MMHG | OXYGEN SATURATION: 96 % | TEMPERATURE: 98 F | HEIGHT: 61 IN | RESPIRATION RATE: 16 BRPM

## 2023-11-17 DIAGNOSIS — Z98.890 OTHER SPECIFIED POSTPROCEDURAL STATES: Chronic | ICD-10-CM

## 2023-11-17 DIAGNOSIS — S46.011A STRAIN OF MUSCLE(S) AND TENDON(S) OF THE ROTATOR CUFF OF RIGHT SHOULDER, INITIAL ENCOUNTER: ICD-10-CM

## 2023-11-17 LAB
ANION GAP SERPL CALC-SCNC: 13 MMOL/L — SIGNIFICANT CHANGE UP (ref 7–14)
ANION GAP SERPL CALC-SCNC: 13 MMOL/L — SIGNIFICANT CHANGE UP (ref 7–14)
BUN SERPL-MCNC: 15 MG/DL — SIGNIFICANT CHANGE UP (ref 7–23)
BUN SERPL-MCNC: 15 MG/DL — SIGNIFICANT CHANGE UP (ref 7–23)
CALCIUM SERPL-MCNC: 9.4 MG/DL — SIGNIFICANT CHANGE UP (ref 8.4–10.5)
CALCIUM SERPL-MCNC: 9.4 MG/DL — SIGNIFICANT CHANGE UP (ref 8.4–10.5)
CHLORIDE SERPL-SCNC: 101 MMOL/L — SIGNIFICANT CHANGE UP (ref 98–107)
CHLORIDE SERPL-SCNC: 101 MMOL/L — SIGNIFICANT CHANGE UP (ref 98–107)
CO2 SERPL-SCNC: 27 MMOL/L — SIGNIFICANT CHANGE UP (ref 22–31)
CO2 SERPL-SCNC: 27 MMOL/L — SIGNIFICANT CHANGE UP (ref 22–31)
CREAT SERPL-MCNC: 0.64 MG/DL — SIGNIFICANT CHANGE UP (ref 0.5–1.3)
CREAT SERPL-MCNC: 0.64 MG/DL — SIGNIFICANT CHANGE UP (ref 0.5–1.3)
EGFR: 102 ML/MIN/1.73M2 — SIGNIFICANT CHANGE UP
EGFR: 102 ML/MIN/1.73M2 — SIGNIFICANT CHANGE UP
GLUCOSE SERPL-MCNC: 120 MG/DL — HIGH (ref 70–99)
GLUCOSE SERPL-MCNC: 120 MG/DL — HIGH (ref 70–99)
HCT VFR BLD CALC: 37.2 % — SIGNIFICANT CHANGE UP (ref 34.5–45)
HCT VFR BLD CALC: 37.2 % — SIGNIFICANT CHANGE UP (ref 34.5–45)
HGB BLD-MCNC: 12.3 G/DL — SIGNIFICANT CHANGE UP (ref 11.5–15.5)
HGB BLD-MCNC: 12.3 G/DL — SIGNIFICANT CHANGE UP (ref 11.5–15.5)
MCHC RBC-ENTMCNC: 28.3 PG — SIGNIFICANT CHANGE UP (ref 27–34)
MCHC RBC-ENTMCNC: 28.3 PG — SIGNIFICANT CHANGE UP (ref 27–34)
MCHC RBC-ENTMCNC: 33.1 GM/DL — SIGNIFICANT CHANGE UP (ref 32–36)
MCHC RBC-ENTMCNC: 33.1 GM/DL — SIGNIFICANT CHANGE UP (ref 32–36)
MCV RBC AUTO: 85.5 FL — SIGNIFICANT CHANGE UP (ref 80–100)
MCV RBC AUTO: 85.5 FL — SIGNIFICANT CHANGE UP (ref 80–100)
NRBC # BLD: 0 /100 WBCS — SIGNIFICANT CHANGE UP (ref 0–0)
NRBC # BLD: 0 /100 WBCS — SIGNIFICANT CHANGE UP (ref 0–0)
NRBC # FLD: 0 K/UL — SIGNIFICANT CHANGE UP (ref 0–0)
NRBC # FLD: 0 K/UL — SIGNIFICANT CHANGE UP (ref 0–0)
PLATELET # BLD AUTO: 347 K/UL — SIGNIFICANT CHANGE UP (ref 150–400)
PLATELET # BLD AUTO: 347 K/UL — SIGNIFICANT CHANGE UP (ref 150–400)
POTASSIUM SERPL-MCNC: 3.7 MMOL/L — SIGNIFICANT CHANGE UP (ref 3.5–5.3)
POTASSIUM SERPL-MCNC: 3.7 MMOL/L — SIGNIFICANT CHANGE UP (ref 3.5–5.3)
POTASSIUM SERPL-SCNC: 3.7 MMOL/L — SIGNIFICANT CHANGE UP (ref 3.5–5.3)
POTASSIUM SERPL-SCNC: 3.7 MMOL/L — SIGNIFICANT CHANGE UP (ref 3.5–5.3)
RBC # BLD: 4.35 M/UL — SIGNIFICANT CHANGE UP (ref 3.8–5.2)
RBC # BLD: 4.35 M/UL — SIGNIFICANT CHANGE UP (ref 3.8–5.2)
RBC # FLD: 13.2 % — SIGNIFICANT CHANGE UP (ref 10.3–14.5)
RBC # FLD: 13.2 % — SIGNIFICANT CHANGE UP (ref 10.3–14.5)
SODIUM SERPL-SCNC: 141 MMOL/L — SIGNIFICANT CHANGE UP (ref 135–145)
SODIUM SERPL-SCNC: 141 MMOL/L — SIGNIFICANT CHANGE UP (ref 135–145)
WBC # BLD: 9.71 K/UL — SIGNIFICANT CHANGE UP (ref 3.8–10.5)
WBC # BLD: 9.71 K/UL — SIGNIFICANT CHANGE UP (ref 3.8–10.5)
WBC # FLD AUTO: 9.71 K/UL — SIGNIFICANT CHANGE UP (ref 3.8–10.5)
WBC # FLD AUTO: 9.71 K/UL — SIGNIFICANT CHANGE UP (ref 3.8–10.5)

## 2023-11-17 PROCEDURE — 99213 OFFICE O/P EST LOW 20 MIN: CPT | Mod: 24

## 2023-11-17 RX ORDER — IBUPROFEN 200 MG
1 TABLET ORAL
Refills: 0 | DISCHARGE

## 2023-11-17 RX ORDER — PSYLLIUM SEED (WITH DEXTROSE)
3.4 POWDER (GRAM) ORAL
Refills: 0 | DISCHARGE

## 2023-11-17 RX ORDER — CYCLOBENZAPRINE HYDROCHLORIDE 10 MG/1
1 TABLET, FILM COATED ORAL
Refills: 0 | DISCHARGE

## 2023-11-17 RX ORDER — SODIUM CHLORIDE 9 MG/ML
3 INJECTION INTRAMUSCULAR; INTRAVENOUS; SUBCUTANEOUS EVERY 8 HOURS
Refills: 0 | Status: DISCONTINUED | OUTPATIENT
Start: 2023-11-28 | End: 2023-12-13

## 2023-11-17 RX ORDER — SODIUM CHLORIDE 9 MG/ML
1000 INJECTION, SOLUTION INTRAVENOUS
Refills: 0 | Status: DISCONTINUED | OUTPATIENT
Start: 2023-11-28 | End: 2023-12-13

## 2023-11-17 RX ORDER — MELOXICAM 15 MG/1
1 TABLET ORAL
Refills: 0 | DISCHARGE

## 2023-11-17 NOTE — H&P PST ADULT - NECK
FROM/supple/symmetric/no tracheal deviation/no palpable masses FROM/supple/symmetric/no tracheal deviation/no thyromegaly/no palpable masses

## 2023-11-17 NOTE — H&P PST ADULT - PROBLEM SELECTOR PLAN 1
preop for right shoulder arthroscopy debridement synovectomy subscapular repair, subacromial decompression supraspinatus repair on 11/28/23  preop instructions given, pt verbalized understanding  GI prophylaxis and chlorhexidine wash provided  cbc, bmp pending

## 2023-11-17 NOTE — H&P PST ADULT - HISTORY OF PRESENT ILLNESS
57 y/o female presents to PST preop for right shoulder arthroscopy debridement synovectomy subscapular repair, subacromial decompression supraspinatus repair. Pt reports right shoulder pain after suffering a fall while at work. Pt s/p MRI of right shoulder which demonstrated rotator cuff tear.  57 y/o female presents to PST preop for right shoulder arthroscopy debridement synovectomy subscapular repair, subacromial decompression supraspinatus repair. Pt reports right shoulder pain after suffering a fall while at work.  s/p MRI of right shoulder which demonstrated rotator cuff tear.

## 2023-11-17 NOTE — H&P PST ADULT - FUNCTIONAL STATUS
Activity: walks 18,000 steps a day, bakes every other day, climbs stairs, performs light house    Symptoms: None   Mets: 6.05 using DASI calculator

## 2023-11-17 NOTE — H&P PST ADULT - RESPIRATORY
clear to auscultation bilaterally/no wheezes/airway patent/breath sounds equal clear to auscultation bilaterally/no wheezes/airway patent/breath sounds equal/good air movement/respirations non-labored

## 2023-11-17 NOTE — H&P PST ADULT - NSICDXPASTSURGICALHX_GEN_ALL_CORE_FT
PAST SURGICAL HISTORY:  H/O endoscopy     H/O foot surgery     S/P bunionectomy     S/P carpal tunnel release     S/P colonoscopy     S/P trigger finger release

## 2023-11-17 NOTE — H&P PST ADULT - ASSESSMENT
57 y/o female presents to PST preop for right shoulder arthroscopy debridement synovectomy subscapular repair, subacromial decompression supraspinatus repair. Pt reports right shoulder pain after suffering a fall while at work.  s/p MRI of right shoulder which demonstrated rotator cuff tear.

## 2023-11-17 NOTE — H&P PST ADULT - MUSCULOSKELETAL
details… ROM intact/normal gait ROM intact/no joint swelling/decreased ROM due to pain/normal gait/strength 5/5 bilateral lower extremities/decreased strength

## 2023-11-17 NOTE — H&P PST ADULT - NSICDXPASTMEDICALHX_GEN_ALL_CORE_FT
PAST MEDICAL HISTORY:  Biceps tendon tear     Bunion, right foot     H/O carpal tunnel syndrome     Strain of muscle(s) and tendon(s) of the rotator cuff of right shoulder, initial encounter

## 2023-11-17 NOTE — H&P PST ADULT - NSANTHOSAYNRD_GEN_A_CORE
[id number] Comprehensive No. ANDREW screening performed.  STOP BANG Legend: 0-2 = LOW Risk; 3-4 = INTERMEDIATE Risk; 5-8 = HIGH Risk

## 2023-11-17 NOTE — H&P PST ADULT - NSICDXFAMILYHX_GEN_ALL_CORE_FT
FAMILY HISTORY:  Father  Still living? Unknown  FH: bladder cancer, Age at diagnosis: Age Unknown    Mother  Still living? Unknown  FH: lung cancer, Age at diagnosis: Age Unknown

## 2023-11-20 ENCOUNTER — APPOINTMENT (OUTPATIENT)
Dept: ORTHOPEDIC SURGERY | Facility: CLINIC | Age: 58
End: 2023-11-20
Payer: OTHER MISCELLANEOUS

## 2023-11-20 VITALS — HEIGHT: 61 IN | BODY MASS INDEX: 31.15 KG/M2 | WEIGHT: 165 LBS

## 2023-11-20 DIAGNOSIS — S46.011A STRAIN OF MUSCLE(S) AND TENDON(S) OF THE ROTATOR CUFF OF RIGHT SHOULDER, INITIAL ENCOUNTER: ICD-10-CM

## 2023-11-20 PROCEDURE — 99214 OFFICE O/P EST MOD 30 MIN: CPT | Mod: 24

## 2023-11-20 RX ORDER — GABAPENTIN 300 MG/1
300 CAPSULE ORAL 3 TIMES DAILY
Qty: 15 | Refills: 0 | Status: ACTIVE | COMMUNITY
Start: 2023-11-20 | End: 1900-01-01

## 2023-11-20 RX ORDER — HYDROCODONE BITARTRATE AND ACETAMINOPHEN 7.5; 325 MG/1; MG/1
7.5-325 TABLET ORAL
Qty: 42 | Refills: 0 | Status: ACTIVE | COMMUNITY
Start: 2023-11-20 | End: 1900-01-01

## 2023-11-20 RX ORDER — KETOROLAC TROMETHAMINE 10 MG/1
10 TABLET, FILM COATED ORAL EVERY 6 HOURS
Qty: 20 | Refills: 0 | Status: ACTIVE | COMMUNITY
Start: 2023-11-20 | End: 1900-01-01

## 2023-11-27 ENCOUNTER — TRANSCRIPTION ENCOUNTER (OUTPATIENT)
Age: 58
End: 2023-11-27

## 2023-11-28 ENCOUNTER — OUTPATIENT (OUTPATIENT)
Dept: OUTPATIENT SERVICES | Facility: HOSPITAL | Age: 58
LOS: 1 days | Discharge: HOME | End: 2023-11-28

## 2023-11-28 ENCOUNTER — TRANSCRIPTION ENCOUNTER (OUTPATIENT)
Age: 58
End: 2023-11-28

## 2023-11-28 ENCOUNTER — APPOINTMENT (OUTPATIENT)
Dept: ORTHOPEDIC SURGERY | Facility: AMBULATORY SURGERY CENTER | Age: 58
End: 2023-11-28
Payer: OTHER MISCELLANEOUS

## 2023-11-28 VITALS
SYSTOLIC BLOOD PRESSURE: 119 MMHG | TEMPERATURE: 98 F | OXYGEN SATURATION: 97 % | WEIGHT: 165.35 LBS | RESPIRATION RATE: 16 BRPM | DIASTOLIC BLOOD PRESSURE: 77 MMHG | HEART RATE: 78 BPM

## 2023-11-28 VITALS
TEMPERATURE: 97 F | HEART RATE: 82 BPM | SYSTOLIC BLOOD PRESSURE: 109 MMHG | DIASTOLIC BLOOD PRESSURE: 56 MMHG | RESPIRATION RATE: 16 BRPM | OXYGEN SATURATION: 99 %

## 2023-11-28 DIAGNOSIS — S46.011A STRAIN OF MUSCLE(S) AND TENDON(S) OF THE ROTATOR CUFF OF RIGHT SHOULDER, INITIAL ENCOUNTER: ICD-10-CM

## 2023-11-28 DIAGNOSIS — Z98.890 OTHER SPECIFIED POSTPROCEDURAL STATES: Chronic | ICD-10-CM

## 2023-11-28 PROCEDURE — 29826 SHO ARTHRS SRG DECOMPRESSION: CPT | Mod: 79,RT

## 2023-11-28 PROCEDURE — 29821 SHO ARTHRS SRG COMPL SYNVCT: CPT | Mod: 79,59,RT

## 2023-11-28 PROCEDURE — 29823 SHO ARTHRS SRG XTNSV DBRDMT: CPT | Mod: 79,RT

## 2023-11-28 PROCEDURE — 29827 SHO ARTHRS SRG RT8TR CUF RPR: CPT | Mod: 79,RT

## 2023-11-28 PROCEDURE — 29999 UNLISTED PX ARTHROSCOPY: CPT | Mod: 79

## 2023-11-28 DEVICE — ANCHOR HEALIX ADVANCE 3 PEEK 6.5MM: Type: IMPLANTABLE DEVICE | Site: RIGHT | Status: FUNCTIONAL

## 2023-11-28 NOTE — ASU DISCHARGE PLAN (ADULT/PEDIATRIC) - PATIENT EDUCATION MATERIALS PROVIED
follow all instructions as directed by Dr Edd Peñaloza instruction sheet given/Provider pre-printed instructions given

## 2023-11-28 NOTE — ASU DISCHARGE PLAN (ADULT/PEDIATRIC) - CARE PROVIDER_API CALL
Orlando Puga  Orthopaedic Surgery  [address] [place] [id number]  Phone: [phone]  Fax: [phone]  Follow Up Time: 2 weeks

## 2023-11-28 NOTE — ASU PREOP CHECKLIST - TEMPERATURE IN FAHRENHEIT (DEGREES F)
Problem: Pain  Goal: #Acceptable pain level achieved/maintained at rest using NRS/Faces  Description: This goal is used for patients who can self-report.  Acceptable means the level is at or below the identified comfort/function goal.  Outcome: Outcome Met, Continue evaluating goal progress toward completion     Problem: At Risk for Falls  Goal: # Patient does not fall  Outcome: Outcome Met, Continue evaluating goal progress toward completion     Problem: VTE, Risk for  Goal: # No s/s of VTE  Outcome: Outcome Met, Continue evaluating goal progress toward completion      97.5

## 2023-11-28 NOTE — ASU PREOPERATIVE ASSESSMENT, ADULT (IPARK ONLY) - PROCEDURE
right shoulder arthroscopy debridement synovectomy subscapularis repair subacromial decompression supraspinatus repair

## 2023-11-28 NOTE — ASU DISCHARGE PLAN (ADULT/PEDIATRIC) - DISCHARGE TO
This patient is eligible for outpatient care navigation through the [facility]  readmission reduction initiative. This patient will be engaged by the transitional care management team to enroll into this program. Please call the number above to facilitate this enrollment or for close follow up. Home

## 2023-11-28 NOTE — ASU DISCHARGE PLAN (ADULT/PEDIATRIC) - NS MD DC FALL RISK RISK
For information on Fall & Injury Prevention, visit: [email or web address] OR  [email or web address] OR  https://www.cdc.gov/steadi/patient.html

## 2023-11-28 NOTE — ASU DISCHARGE PLAN (ADULT/PEDIATRIC) - ASU DC SPECIAL INSTRUCTIONSFT
Please follow the instructions provided to you by Dr. Puga regarding post operative care and follow up.  If you were told that you would be taking prescribed pain control (i.e. opiates/narcotics) they have been sent to your pharmacy.  If not then please take over the counter pain medication (i.e. Tylenol/Advil) as instructed on the labels

## 2023-12-08 ENCOUNTER — APPOINTMENT (OUTPATIENT)
Dept: ORTHOPEDIC SURGERY | Facility: CLINIC | Age: 58
End: 2023-12-08
Payer: OTHER MISCELLANEOUS

## 2023-12-08 DIAGNOSIS — S46.219A STRAIN OF MUSCLE, FASCIA AND TENDON OF OTHER PARTS OF BICEPS, UNSPECIFIED ARM, INITIAL ENCOUNTER: ICD-10-CM

## 2023-12-08 DIAGNOSIS — S46.891A OTHER INJURY OF OTHER MUSCLES, FASCIA AND TENDONS AT SHOULDER AND UPPER ARM LEVEL, RIGHT ARM, INITIAL ENCOUNTER: ICD-10-CM

## 2023-12-08 PROBLEM — S46.011A STRAIN OF MUSCLE(S) AND TENDON(S) OF THE ROTATOR CUFF OF RIGHT SHOULDER, INITIAL ENCOUNTER: Chronic | Status: ACTIVE | Noted: 2023-11-17

## 2023-12-08 PROBLEM — M21.611 BUNION OF RIGHT FOOT: Chronic | Status: ACTIVE | Noted: 2023-11-17

## 2023-12-08 PROBLEM — Z86.69 PERSONAL HISTORY OF OTHER DISEASES OF THE NERVOUS SYSTEM AND SENSE ORGANS: Chronic | Status: ACTIVE | Noted: 2023-11-17

## 2023-12-08 PROCEDURE — 73030 X-RAY EXAM OF SHOULDER: CPT | Mod: RT

## 2024-01-05 ENCOUNTER — APPOINTMENT (OUTPATIENT)
Dept: ORTHOPEDIC SURGERY | Facility: CLINIC | Age: 59
End: 2024-01-05

## 2024-01-05 ENCOUNTER — APPOINTMENT (OUTPATIENT)
Dept: ORTHOPEDIC SURGERY | Facility: CLINIC | Age: 59
End: 2024-01-05
Payer: OTHER MISCELLANEOUS

## 2024-01-05 VITALS — HEIGHT: 61 IN | WEIGHT: 165 LBS | BODY MASS INDEX: 31.15 KG/M2

## 2024-01-05 PROCEDURE — 99024 POSTOP FOLLOW-UP VISIT: CPT

## 2024-01-05 NOTE — ASSESSMENT
[FreeTextEntry1] : We reviewed her course. She may d/c the sling on 1/9/24. NSAIDs are OK then. MDP now.  Premedicating for physical therapy was discussed.  PT will continue.  Reviewed the importance of stretching and improving motion.   Patient was seen by Dr. Orlando Puga. Patient was seen by Lillian ENRIQUEZ under the supervision of Dr. Orlando Puga. Progress note was completed by Lillian ENRIQUEZ.

## 2024-01-05 NOTE — PHYSICAL EXAM
[Right] : right shoulder [Moderate] : moderate [] : pain with forward flexion [Supine] : supine [de-identified] : Strength was not assessed. [TWNoteComboBox4] : passive forward flexion 120 degrees [de-identified] : external rotation 30 degrees

## 2024-01-05 NOTE — REASON FOR VISIT
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  The left shoulder is involved as per the MTC 00 report.  DOS: 11/28/2023 Procedure: Right Shoulder Arthroscopy, Glenohumeral Debridement, Subscapularis Repair, Subacromial Decompression, Medium Supraspinatus/Infraspinatus Repair Diagnosis: Subscapularis Tear, Biceps Tear, Supraspinatus/Infraspinatus Tear, Subacromial Impingement, Shoulder Pain, Partial Anterior labral Tear, Glenohumeral Synovitis, Glenohumeral Chondrosis, SLAP Tear  She is doing OK.  She is in her sling.  She has used the pain meds sparingly.  Her friend is here.

## 2024-01-08 ENCOUNTER — NON-APPOINTMENT (OUTPATIENT)
Age: 59
End: 2024-01-08

## 2024-01-09 RX ORDER — METHYLPREDNISOLONE 4 MG/1
4 TABLET ORAL
Qty: 1 | Refills: 0 | Status: ACTIVE | COMMUNITY
Start: 2023-11-01 | End: 1900-01-01

## 2024-02-02 ENCOUNTER — APPOINTMENT (OUTPATIENT)
Dept: ORTHOPEDIC SURGERY | Facility: CLINIC | Age: 59
End: 2024-02-02
Payer: OTHER MISCELLANEOUS

## 2024-02-02 VITALS — HEIGHT: 61 IN | BODY MASS INDEX: 31.15 KG/M2 | WEIGHT: 165 LBS

## 2024-02-02 PROCEDURE — 99214 OFFICE O/P EST MOD 30 MIN: CPT | Mod: 24

## 2024-02-02 RX ORDER — CELECOXIB 200 MG/1
200 CAPSULE ORAL TWICE DAILY
Qty: 60 | Refills: 0 | Status: COMPLETED | COMMUNITY
Start: 2024-02-02 | End: 2024-03-03

## 2024-02-02 NOTE — HISTORY OF PRESENT ILLNESS
[de-identified] : Here for post op visit s/p right shoulder arthroscopy. Doing well. Going to PT.  [de-identified] : PT, MDP

## 2024-02-02 NOTE — ASSESSMENT
[FreeTextEntry1] : Course outlined. She is making gains with PT, more is prescribed. The patient is prescribed Celebrex.  We will obtain an MRI of the left shoulder as it is included in her case and she continues to have pain. Cautions advised. Questions addressed.  Patient seen by Orlando Puga Shoulder Surgery  The documentation recorded by the scribe accurately reflects the service I personally performed and the decisions made by me. Entered by Piero Schwartz acting as scribe.

## 2024-02-02 NOTE — REASON FOR VISIT
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  The left shoulder is involved as per the MTC 00 report.  DOS: 11/28/2023 Procedure: Right Shoulder Arthroscopy, Glenohumeral Debridement, Subscapularis Repair, Subacromial Decompression, Medium Supraspinatus/Infraspinatus Repair Diagnosis: Subscapularis Tear, Biceps Tear, Supraspinatus/Infraspinatus Tear, Subacromial Impingement, Shoulder Pain, Partial Anterior labral Tear, Glenohumeral Synovitis, Glenohumeral Chondrosis, SLAP Tear  She is doing OK. She is going to PT and progressing. MDP did not help.

## 2024-02-02 NOTE — PHYSICAL EXAM
[Right] : right shoulder [Supine] : supine [Left] : left shoulder [Sitting] : sitting [Severe] : severe [Moderate] : moderate [] : no sensory deficits [de-identified] : Strength was not assessed. [FreeTextEntry9] : IR to PSIS [TWNoteComboBox4] : passive forward flexion 150 degrees [de-identified] : external rotation 60 degrees

## 2024-02-09 ENCOUNTER — APPOINTMENT (OUTPATIENT)
Dept: ORTHOPEDIC SURGERY | Facility: CLINIC | Age: 59
End: 2024-02-09
Payer: OTHER MISCELLANEOUS

## 2024-02-09 PROCEDURE — 99213 OFFICE O/P EST LOW 20 MIN: CPT | Mod: 24

## 2024-02-09 NOTE — ASSESSMENT
[FreeTextEntry1] : EMG BUEs 11/8/23-  Mild L>R CTS no e/o cervical radic on EMG PT as per liyah Peñaloza  Muscle Relaxants- To help decrease muscle spasm and assist with pain relief. Advised of sedating effects and instructed not to drive, operate heavy machinery, or take with other sedating medications.  NSAIDs- Patient warned of risk of medication to GI tract, increased blood pressure, cardiac risk, and risk of fluid retention.  Advised to clear medication with internist or PCP if any concurrent health problem with heart, blood pressure, or GI system exists.  Patient seen by Carolina Silverio PA-C, with and under the supervision of  Dr. Ollie Camejo M.D.

## 2024-02-09 NOTE — IMAGING
[de-identified] : CSPINE Inspection: No rash or ecchymosis Palpation: right trapezial spasm/tenderness ROM: Full ROM. Pain with right lateral rotation Strength: 5/5 bilateral deltoid, biceps, triceps, wrist flexors, wrist extensors, , abductors Sensation: Sensation present to light touch bilateral C5-T1 distributions Reflexes: Negative Dudley's bilaterally

## 2024-02-09 NOTE — HISTORY OF PRESENT ILLNESS
[Sudden] : sudden [6] : 6 [Dull/Aching] : dull/aching [Constant] : constant [7] : 7 [de-identified] : Pt seen by non-spine partners in the past  WC DOI 8/18/23 Occupation:  sales at [employer]  C spine MRI [facility] 10/6/23- OSSEOUS STRUCTURES: Vertebral body heights are preserved. No marrow edema or destructive marrow infiltrative process. Small anterior marginal osteophytes at C4-5 to C6-7. ALIGNMENT: Straightening of the cervical spine. No spondylolisthesis. SPINAL CORD: No abnormal cord signal. POSTERIOR FOSSA/CERVICOMEDULLARY JUNCTION: Unremarkable. NECK/PARASPINAL SOFT TISSUES: Unremarkable. INCLUDED THORACIC SPINE: Unremarkable. DISCS: Moderate disc height loss at C5-6 and C6-7. Mild disc height loss at C4-5. Multilevel disc desiccation. The following axial levels are imaged and detailed below: C2-C3: No disc bulging or herniation. No spinal canal or foraminal stenosis. C3-C4: No disc bulging or herniation. No spinal canal or foraminal stenosis. C4-C5: Mild disc osteophyte complex impinges the ventral thecal sac. No spinal canal or foraminal stenosis. C5-C6: Mild disc osteophyte complex impinges the ventral thecal sac. Moderate right and mild left foraminal stenosis. C6-C7: Mild disc osteophyte complex impinges the ventral thecal sac. No spinal canal or foraminal stenosis. C7-T1: Mild disc osteophyte complex impinges the ventral thecal sac. No spinal canal or foraminal stenosis. Ind. 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 Associated R RTC tear (seeing Dr. Gomez).  Otherwise denies pain/N/T in BUEs. Pain most severe when lying down. Has tried Tylenol, without relief. Denies prior neck physical therapy/surgeries. Denies b/b dysfunction.   PMH: Asthma. Denies DM/HTN/HLD/CA history  11/17/23-EMG f/u. Continued neck pain. Intermittent N/T in bilateral hands, although mild. Planning on getting R shoulder surgery 11/28/23 with Dr. Puga 2/9/24-Pain has slightly improved since last visit. In PT for the shoulders.  [] : no [FreeTextEntry3] : 8.18.2023 [FreeTextEntry5] : pain has improved since last visit. [de-identified] : none

## 2024-02-16 ENCOUNTER — NON-APPOINTMENT (OUTPATIENT)
Age: 59
End: 2024-02-16

## 2024-02-16 ENCOUNTER — APPOINTMENT (OUTPATIENT)
Dept: MRI IMAGING | Facility: CLINIC | Age: 59
End: 2024-02-16
Payer: OTHER MISCELLANEOUS

## 2024-02-16 PROCEDURE — 73221 MRI JOINT UPR EXTREM W/O DYE: CPT | Mod: LT

## 2024-03-01 ENCOUNTER — APPOINTMENT (OUTPATIENT)
Dept: ORTHOPEDIC SURGERY | Facility: CLINIC | Age: 59
End: 2024-03-01
Payer: OTHER MISCELLANEOUS

## 2024-03-01 VITALS — WEIGHT: 165 LBS | BODY MASS INDEX: 31.15 KG/M2 | HEIGHT: 61 IN

## 2024-03-01 DIAGNOSIS — M75.21 BICIPITAL TENDINITIS, RIGHT SHOULDER: ICD-10-CM

## 2024-03-01 DIAGNOSIS — M75.41 IMPINGEMENT SYNDROME OF RIGHT SHOULDER: ICD-10-CM

## 2024-03-01 PROCEDURE — 99214 OFFICE O/P EST MOD 30 MIN: CPT

## 2024-03-02 PROBLEM — M75.41 IMPINGEMENT SYNDROME OF RIGHT SHOULDER: Status: ACTIVE | Noted: 2024-03-02

## 2024-03-02 PROBLEM — M75.21 BICEPS TENDINITIS OF RIGHT UPPER EXTREMITY: Status: ACTIVE | Noted: 2024-03-02

## 2024-03-02 NOTE — HISTORY OF PRESENT ILLNESS
[de-identified] : Here for post op visit s/p right shoulder arthroscopy.  MRI done on the left shoulder. [de-identified] : PT, MDP

## 2024-03-02 NOTE — DATA REVIEWED
[FreeTextEntry1] : MRI L SHOULDER [facility] 2/16/24:  There is a medium supraspinatus tear read as 1.3 cm.  There are biceps changes.  There is possible subscapularis tearing.  The muscle is sufficient.  There are minor AC changes.

## 2024-03-02 NOTE — CONSULT LETTER
[Dear  ___] : Dear  [unfilled], [Sincerely,] : Sincerely, [Courtesy Letter:] : I had the pleasure of seeing your patient, [unfilled], in my office today. [FreeTextEntry1] : Please see my note below.  If you have any questions, please do not hesitate to contact me. [FreeTextEntry3] : Orlando Puga M.D. Shoulder Surgery

## 2024-03-02 NOTE — PHYSICAL EXAM
[Right] : right shoulder [Supine] : supine [Left] : left shoulder [Sitting] : sitting [Severe] : severe [Moderate] : moderate [] : no sensory deficits [de-identified] : Strength was not assessed. [FreeTextEntry9] : IR to PSIS [de-identified] : external rotation 60 degrees [TWNoteComboBox4] : passive forward flexion 150 degrees

## 2024-03-02 NOTE — ASSESSMENT
[FreeTextEntry1] : . We outlined her options for the left including timing.  We discussed treatment options, both non-operative and operative.  I do think she is a candidate for left surgery.  Pain relief is a goal as well as improving function and motion.  As I had done with the contralateral shoulder, I reviewed surgical techniques pictorially in the books that I co-edited.  Interscalene anesthesia, general anesthesia and postoperative pain management were discussed.  The importance of physical therapy postoperatively, the gradual recovery and the rehabilitation program with initial driving restrictions were noted.  The use of a Cryo-Cuff by [employer] and a sling for functional recovery was reviewed.  She understands there are no guarantees.  The benefits of decreased pain, increased function and restoring anatomy were outlined.  The risks were reviewed including, but not limited to, infection, failure, bleeding, stiffness, pain, clotting, fracture, re-tear, hardware failure, deformity, functional limitation, scarring, neurovascular compromise, and narcotic use issues.  Under certain circumstances we discussed, further surgery may be indicated.  She understands that 100% recovery is not expected, and the desired level of function may not be achievable.  The complicated nature of her condition, including the tear pattern, was noted.  We discussed the potential for a prolonged recovery course and the potential for this to affect her activities, which could include a work regimen.  Her questions were answered.  Other opinions can be pursued, as we discussed.  She does wish to proceed with surgery.  This would include a left shoulder arthroscopy, debridement, synovectomy, subscapularis repair, decompression, biceps tenodesis, medium cuff repair.  Cont right shoulder PT is medically necessary.  Paperwork was completed for work.  Medical clearance is planned.  Timing was discussed.  We will schedule this at the earliest mutual convenient time.  Patient seen by Dr. Orlando Puga MD. Patient seen by Lillian ENRIQUEZ under the supervision of Dr. Orlando Puga. Progress note completed by Lillian ENRIQUEZ. Entered by Piero Schwartz acting as scribe.

## 2024-03-29 ENCOUNTER — APPOINTMENT (OUTPATIENT)
Dept: ORTHOPEDIC SURGERY | Facility: CLINIC | Age: 59
End: 2024-03-29
Payer: OTHER MISCELLANEOUS

## 2024-03-29 VITALS — WEIGHT: 165 LBS | BODY MASS INDEX: 31.15 KG/M2 | HEIGHT: 61 IN

## 2024-03-29 PROCEDURE — 99214 OFFICE O/P EST MOD 30 MIN: CPT

## 2024-03-29 NOTE — IMAGING
[de-identified] : CSPINE Inspection: No rash or ecchymosis Palpation: trapezial spasm/tenderness ROM: Full ROM.  Strength: 5/5 bilateral deltoid, biceps, triceps, wrist flexors, wrist extensors, , abductors Sensation: Sensation present to light touch bilateral C5-T1 distributions Reflexes: Negative Dudley's bilaterally

## 2024-03-29 NOTE — HISTORY OF PRESENT ILLNESS
[Sudden] : sudden [6] : 6 [7] : 7 [Dull/Aching] : dull/aching [Constant] : constant [de-identified] : WC DOI 8/18/23 Occupation:  sales at [employer]  C spine MRI [facility] 10/6/23- OSSEOUS STRUCTURES: Vertebral body heights are preserved. No marrow edema or destructive marrow infiltrative process. Small anterior marginal osteophytes at C4-5 to C6-7. ALIGNMENT: Straightening of the cervical spine. No spondylolisthesis. SPINAL CORD: No abnormal cord signal. POSTERIOR FOSSA/CERVICOMEDULLARY JUNCTION: Unremarkable. NECK/PARASPINAL SOFT TISSUES: Unremarkable. INCLUDED THORACIC SPINE: Unremarkable. DISCS: Moderate disc height loss at C5-6 and C6-7. Mild disc height loss at C4-5. Multilevel disc desiccation. The following axial levels are imaged and detailed below: C2-C3: No disc bulging or herniation. No spinal canal or foraminal stenosis. C3-C4: No disc bulging or herniation. No spinal canal or foraminal stenosis. C4-C5: Mild disc osteophyte complex impinges the ventral thecal sac. No spinal canal or foraminal stenosis. C5-C6: Mild disc osteophyte complex impinges the ventral thecal sac. Moderate right and mild left foraminal stenosis. C6-C7: Mild disc osteophyte complex impinges the ventral thecal sac. No spinal canal or foraminal stenosis. C7-T1: Mild disc osteophyte complex impinges the ventral thecal sac. No spinal canal or foraminal stenosis. Ind. 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 Associated R RTC tear (seeing Dr. Gomez).  Otherwise denies pain/N/T in BUEs. Pain most severe when lying down. Has tried Tylenol, without relief. Denies prior neck physical therapy/surgeries. Denies b/b dysfunction.   PMH: Asthma. Denies DM/HTN/HLD/CA history  11/17/23-EMG f/u. Continued neck pain. Intermittent N/T in bilateral hands, although mild. Planning on getting R shoulder surgery 11/28/23 with Dr. Puga 2/9/24-Pain has slightly improved since last visit. In PT for the shoulders.  3/29/24- still neck tightness, b/l trap pain.  [] : no [FreeTextEntry3] : 8.18.2023 [FreeTextEntry5] : pain has improved since last visit. [de-identified] : none

## 2024-03-29 NOTE — ASSESSMENT
[FreeTextEntry1] : EMG BUEs 11/8/23-  Mild L>R CTS no e/o cervical radic on EMG PT as per liyah Peñaloza  Muscle Relaxants- To help decrease muscle spasm and assist with pain relief. Advised of sedating effects and instructed not to drive, operate heavy machinery, or take with other sedating medications.  NSAIDs- Patient warned of risk of medication to GI tract, increased blood pressure, cardiac risk, and risk of fluid retention.  Advised to clear medication with internist or PCP if any concurrent health problem with heart, blood pressure, or GI system exists.

## 2024-04-05 ENCOUNTER — APPOINTMENT (OUTPATIENT)
Dept: ORTHOPEDIC SURGERY | Facility: CLINIC | Age: 59
End: 2024-04-05
Payer: OTHER MISCELLANEOUS

## 2024-04-05 VITALS — HEIGHT: 61 IN | BODY MASS INDEX: 31.15 KG/M2 | WEIGHT: 165 LBS

## 2024-04-05 PROCEDURE — 99214 OFFICE O/P EST MOD 30 MIN: CPT

## 2024-04-05 PROCEDURE — 73030 X-RAY EXAM OF SHOULDER: CPT | Mod: LT

## 2024-04-05 PROCEDURE — 73010 X-RAY EXAM OF SHOULDER BLADE: CPT | Mod: LT

## 2024-04-05 NOTE — ASSESSMENT
[FreeTextEntry1] : For her right, which is post-op, she needs more  PT. For the left, we are awaiting surgery approval. She does wish to proceed with surgery. This would include a left shoulder arthroscopy, debridement, synovectomy, subscapularis repair, decompression, biceps tenodesis, medium cuff repair.  Cont right shoulder PT is medically necessary.  Timing was discussed. We will schedule this at the earliest mutual convenient time.  Patient seen by Orlando Puga M.D.

## 2024-04-05 NOTE — PHYSICAL EXAM
[Right] : right shoulder [Supine] : supine [Left] : left shoulder [Sitting] : sitting [Severe] : severe [Moderate] : moderate [] : no sensory deficits [de-identified] : Strength was not assessed. [FreeTextEntry9] : IR to PSIS [TWNoteComboBox4] : passive forward flexion 150 degrees [de-identified] : external rotation 60 degrees

## 2024-04-05 NOTE — IMAGING
[Left] : left shoulder [FreeTextEntry1] : The GH joint is ok.  There are AC spurs.  Faint posterior Ca+ is noted. [FreeTextEntry5] : There is a Type II-III acromion.  Lat acromial extension is seen.

## 2024-04-05 NOTE — REASON FOR VISIT
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  The left shoulder is involved as per the MTC 00 report.  DOS: 11/28/2023 Procedure: Right Shoulder Arthroscopy, Glenohumeral Debridement, Subscapularis Repair, Subacromial Decompression, Medium Supraspinatus/Infraspinatus Repair Diagnosis: Subscapularis Tear, Biceps Tear, Supraspinatus/Infraspinatus Tear, Subacromial Impingement, Shoulder Pain, Partial Anterior labral Tear, Glenohumeral Synovitis, Glenohumeral Chondrosis, SLAP Tear  She continues with PT for the right, with even more gains.  She needs more PT on the right.  The left is torn and symptomatic, with surgery requested.

## 2024-04-17 ENCOUNTER — NON-APPOINTMENT (OUTPATIENT)
Age: 59
End: 2024-04-17

## 2024-05-06 ENCOUNTER — APPOINTMENT (OUTPATIENT)
Dept: ORTHOPEDIC SURGERY | Facility: CLINIC | Age: 59
End: 2024-05-06
Payer: OTHER MISCELLANEOUS

## 2024-05-06 VITALS — WEIGHT: 165 LBS | BODY MASS INDEX: 31.15 KG/M2 | HEIGHT: 61 IN

## 2024-05-06 PROCEDURE — 99214 OFFICE O/P EST MOD 30 MIN: CPT

## 2024-05-06 NOTE — HISTORY OF PRESENT ILLNESS
[Work related] : work related [de-identified] : Here for follow up. Continuing PT for the right shoulder which is helping.

## 2024-05-06 NOTE — ASSESSMENT
[FreeTextEntry1] : For her right, which is post-op, she needs more PT. A right shoulder injection was discussed.  She would like to wait.  Cont right shoulder PT is medically necessary. For the left, we are awaiting surgery approval. She does wish to proceed with surgery. This would include a left shoulder arthroscopy, debridement, synovectomy, subscapularis repair, decompression, biceps tenodesis, medium cuff repair. Timing was discussed. We will schedule this at the earliest mutual convenient time.  Patient seen by Orlando Puga M.D. Entered by Yvonne Lay acting as scribe.

## 2024-05-06 NOTE — REASON FOR VISIT
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  The left shoulder is involved as per the MTC 00 report.  DOS: 11/28/2023 Procedure: Right Shoulder Arthroscopy, Glenohumeral Debridement, Subscapularis Repair, Subacromial Decompression, Medium Supraspinatus/Infraspinatus Repair Diagnosis: Subscapularis Tear, Biceps Tear, Supraspinatus/Infraspinatus Tear, Subacromial Impingement, Shoulder Pain, Partial Anterior labral Tear, Glenohumeral Synovitis, Glenohumeral Chondrosis, SLAP Tear  She continues with PT for the right, with even more gains.  She needs more PT on the right.  The left is torn and symptomatic, with surgery requested. The left remains an issue.

## 2024-05-06 NOTE — PHYSICAL EXAM
[Right] : right shoulder [Left] : left shoulder [Moderate] : moderate [Sitting] : sitting [Mild] : mild [4 ___] : forward flexion 4[unfilled]/5 [] : no sensory deficits [TWNoteComboBox4] : passive forward flexion 150 degrees [TWNoteComboBox6] : internal rotation L2 [de-identified] : external rotation 60 degrees

## 2024-05-10 ENCOUNTER — APPOINTMENT (OUTPATIENT)
Dept: ORTHOPEDIC SURGERY | Facility: CLINIC | Age: 59
End: 2024-05-10
Payer: OTHER MISCELLANEOUS

## 2024-05-10 VITALS — BODY MASS INDEX: 31.15 KG/M2 | HEIGHT: 61 IN | WEIGHT: 165 LBS

## 2024-05-10 DIAGNOSIS — G56.02 CARPAL TUNNEL SYNDROME, LEFT UPPER LIMB: ICD-10-CM

## 2024-05-10 DIAGNOSIS — M62.838 OTHER MUSCLE SPASM: ICD-10-CM

## 2024-05-10 DIAGNOSIS — S16.1XXA STRAIN OF MUSCLE, FASCIA AND TENDON AT NECK LEVEL, INITIAL ENCOUNTER: ICD-10-CM

## 2024-05-10 DIAGNOSIS — G56.01 CARPAL TUNNEL SYNDROME, RIGHT UPPER LIMB: ICD-10-CM

## 2024-05-10 PROCEDURE — 99214 OFFICE O/P EST MOD 30 MIN: CPT

## 2024-05-10 NOTE — HISTORY OF PRESENT ILLNESS
[Sudden] : sudden [6] : 6 [7] : 7 [Dull/Aching] : dull/aching [Constant] : constant [de-identified] : WC DOI 8/18/23 Occupation:  sales at [employer]  C spine MRI [facility] 10/6/23- OSSEOUS STRUCTURES: Vertebral body heights are preserved. No marrow edema or destructive marrow infiltrative process. Small anterior marginal osteophytes at C4-5 to C6-7. ALIGNMENT: Straightening of the cervical spine. No spondylolisthesis. SPINAL CORD: No abnormal cord signal. POSTERIOR FOSSA/CERVICOMEDULLARY JUNCTION: Unremarkable. NECK/PARASPINAL SOFT TISSUES: Unremarkable. INCLUDED THORACIC SPINE: Unremarkable. DISCS: Moderate disc height loss at C5-6 and C6-7. Mild disc height loss at C4-5. Multilevel disc desiccation. The following axial levels are imaged and detailed below: C2-C3: No disc bulging or herniation. No spinal canal or foraminal stenosis. C3-C4: No disc bulging or herniation. No spinal canal or foraminal stenosis. C4-C5: Mild disc osteophyte complex impinges the ventral thecal sac. No spinal canal or foraminal stenosis. C5-C6: Mild disc osteophyte complex impinges the ventral thecal sac. Moderate right and mild left foraminal stenosis. C6-C7: Mild disc osteophyte complex impinges the ventral thecal sac. No spinal canal or foraminal stenosis. C7-T1: Mild disc osteophyte complex impinges the ventral thecal sac. No spinal canal or foraminal stenosis. Ind. Review- agree, moderate R NF narrowing C5/6  EMG BUEs 11/8/23-  Mild L>R CTS __________________________________________________________________________________ 10/18/23- 57 y/o RHDF presents for right-sided C spine pain/stiffness since tripping at work and falling on her left side on a cement floor. Associated R RTC tear (seeing Dr. Gomez).  Otherwise denies pain/N/T in BUEs. Pain most severe when lying down. Has tried Tylenol, without relief. Denies prior neck physical therapy/surgeries. Denies b/b dysfunction.   PMH: Asthma. Denies DM/HTN/HLD/CA history  11/17/23-EMG f/u. Continued neck pain. Intermittent N/T in bilateral hands, although mild. Planning on getting R shoulder surgery 11/28/23 with Dr. Puga 2/9/24-Pain has slightly improved since last visit. In PT for the shoulders.  3/29/24- still neck tightness, b/l trap pain. 5/10/24- continued R trap pain and pain down the RUE to the elbow  [] : no [FreeTextEntry3] : 8.18.2023 [de-identified] : none

## 2024-05-10 NOTE — IMAGING
[de-identified] : CSPINE Inspection: No rash or ecchymosis Palpation: R trapezial spasm/tenderness ROM: Full ROM.  Strength: 5/5 bilateral deltoid, biceps, triceps, wrist flexors, wrist extensors, , abductors Sensation: Sensation present to light touch bilateral C5-T1 distributions Reflexes: Negative Dudley's bilaterally

## 2024-05-10 NOTE — ASSESSMENT
[FreeTextEntry1] : EMG BUEs 11/8/23-  Mild L>R CTS no e/o cervical radic on EMG PT as per Dr. Puga, plus neck spasm.   Muscle Relaxants- To help decrease muscle spasm and assist with pain relief. Advised of sedating effects and instructed not to drive, operate heavy machinery, or take with other sedating medications.  NSAIDs- Patient warned of risk of medication to GI tract, increased blood pressure, cardiac risk, and risk of fluid retention.  Advised to clear medication with internist or PCP if any concurrent health problem with heart, blood pressure, or GI system exists.

## 2024-05-10 NOTE — WORK
[Sprain/Strain] : sprain/strain [Was the competent medical cause of the injury] : was the competent medical cause of the injury [Are consistent with the injury] : are consistent with the injury [Consistent with my objective findings] : consistent with my objective findings [Partial] : partial

## 2024-06-03 ENCOUNTER — APPOINTMENT (OUTPATIENT)
Dept: ORTHOPEDIC SURGERY | Facility: CLINIC | Age: 59
End: 2024-06-03
Payer: OTHER MISCELLANEOUS

## 2024-06-03 ENCOUNTER — NON-APPOINTMENT (OUTPATIENT)
Age: 59
End: 2024-06-03

## 2024-06-03 VITALS — BODY MASS INDEX: 31.15 KG/M2 | WEIGHT: 165 LBS | HEIGHT: 61 IN

## 2024-06-03 DIAGNOSIS — S46.211A STRAIN OF MUSCLE, FASCIA AND TENDON OF OTHER PARTS OF BICEPS, RIGHT ARM, INITIAL ENCOUNTER: ICD-10-CM

## 2024-06-03 DIAGNOSIS — S46.011D STRAIN OF MUSCLE(S) AND TENDON(S) OF THE ROTATOR CUFF OF RIGHT SHOULDER, SUBSEQUENT ENCOUNTER: ICD-10-CM

## 2024-06-03 DIAGNOSIS — S46.012D STRAIN OF MUSCLE(S) AND TENDON(S) OF THE ROTATOR CUFF OF LEFT SHOULDER, SUBSEQUENT ENCOUNTER: ICD-10-CM

## 2024-06-03 DIAGNOSIS — S46.891D OTHER INJURY OF OTHER MUSCLES, FASCIA AND TENDONS AT SHOULDER AND UPPER ARM LEVEL, RIGHT ARM, SUBSEQUENT ENCOUNTER: ICD-10-CM

## 2024-06-03 PROCEDURE — 99214 OFFICE O/P EST MOD 30 MIN: CPT

## 2024-06-03 NOTE — PHYSICAL EXAM
[Right] : right shoulder [Mild] : mild [Left] : left shoulder [Sitting] : sitting [Moderate] : moderate [4 ___] : forward flexion 4[unfilled]/5 [5 ___] : forward flexion 5[unfilled]/5 [] : no sensory deficits [TWNoteComboBox4] : passive forward flexion 160 degrees [TWNoteComboBox6] : internal rotation L2 [de-identified] : external rotation 70 degrees

## 2024-06-03 NOTE — DATA REVIEWED
[FreeTextEntry1] : MRI L SHOULDER [facility] 2/16/24:  There is a medium supraspinatus tear read as 1.3 cm.  There are biceps changes.  There is possible subscapularis tearing.  The muscle is sufficient.  There are minor AC changes.   L XR:  The GH joint is ok. There are AC spurs. Faint posterior Ca+ is noted. There is a Type II-III acromion. Lat acromial extension is seen.

## 2024-06-03 NOTE — ASSESSMENT
[FreeTextEntry1] : We discussed her course.  She will continue PT.  Surgery for the left shoulder was approved.  She does still wish to proceed with surgery. This will include a left shoulder arthroscopy, debridement, synovectomy, subscapularis repair, decompression, biceps tenodesis, medium cuff repair. Timing was discussed. We will schedule this at the earliest mutual convenient time.  Patient seen by Orlando Puga M.D. Entered by Yvonne Lay acting as scribe.

## 2024-06-03 NOTE — REASON FOR VISIT
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  The left shoulder is involved as per the [facility] 00 report.  DOS: 11/28/2023 Procedure: Right Shoulder Arthroscopy, Glenohumeral Debridement, Subscapularis Repair, Subacromial Decompression, Medium Supraspinatus/Infraspinatus Repair Diagnosis: Subscapularis Tear, Biceps Tear, Supraspinatus/Infraspinatus Tear, Subacromial Impingement, Shoulder Pain, Partial Anterior labral Tear, Glenohumeral Synovitis, Glenohumeral Chondrosis, SLAP Tear  She continues with PT for the right, with even more gains. The left is more of an issue today.  The left is torn and symptomatic, with surgery requested. Surgery was approved for the left per patient.

## 2024-06-11 ENCOUNTER — NON-APPOINTMENT (OUTPATIENT)
Age: 59
End: 2024-06-11

## 2024-06-12 ENCOUNTER — EMERGENCY (EMERGENCY)
Facility: HOSPITAL | Age: 59
LOS: 0 days | Discharge: HOME | End: 2024-06-13
Attending: EMERGENCY MEDICINE
Payer: COMMERCIAL

## 2024-06-12 VITALS
WEIGHT: 160.06 LBS | RESPIRATION RATE: 18 BRPM | OXYGEN SATURATION: 98 % | TEMPERATURE: 98 F | SYSTOLIC BLOOD PRESSURE: 110 MMHG | DIASTOLIC BLOOD PRESSURE: 74 MMHG | HEIGHT: 61 IN | HEART RATE: 68 BPM

## 2024-06-12 DIAGNOSIS — H11.32 CONJUNCTIVAL HEMORRHAGE, LEFT EYE: ICD-10-CM

## 2024-06-12 DIAGNOSIS — Z91.013 ALLERGY TO SEAFOOD: ICD-10-CM

## 2024-06-12 DIAGNOSIS — G44.209 TENSION-TYPE HEADACHE, UNSPECIFIED, NOT INTRACTABLE: ICD-10-CM

## 2024-06-12 DIAGNOSIS — Z91.018 ALLERGY TO OTHER FOODS: ICD-10-CM

## 2024-06-12 DIAGNOSIS — Z91.041 RADIOGRAPHIC DYE ALLERGY STATUS: ICD-10-CM

## 2024-06-12 DIAGNOSIS — R51.9 HEADACHE, UNSPECIFIED: ICD-10-CM

## 2024-06-12 LAB
BASOPHILS # BLD AUTO: 0.05 K/UL — SIGNIFICANT CHANGE UP (ref 0–0.2)
BASOPHILS NFR BLD AUTO: 0.5 % — SIGNIFICANT CHANGE UP (ref 0–2)
EOSINOPHIL # BLD AUTO: 0.41 K/UL — SIGNIFICANT CHANGE UP (ref 0–0.5)
EOSINOPHIL NFR BLD AUTO: 4.3 % — SIGNIFICANT CHANGE UP (ref 0–6)
HCT VFR BLD CALC: 35.4 % — SIGNIFICANT CHANGE UP (ref 34.5–45)
HGB BLD-MCNC: 11.8 G/DL — SIGNIFICANT CHANGE UP (ref 11.5–15.5)
IMM GRANULOCYTES NFR BLD AUTO: 0.2 % — SIGNIFICANT CHANGE UP (ref 0–0.9)
LYMPHOCYTES # BLD AUTO: 4.68 K/UL — HIGH (ref 1–3.3)
LYMPHOCYTES # BLD AUTO: 48.6 % — HIGH (ref 13–44)
MCHC RBC-ENTMCNC: 28.4 PG — SIGNIFICANT CHANGE UP (ref 27–34)
MCHC RBC-ENTMCNC: 33.3 G/DL — SIGNIFICANT CHANGE UP (ref 32–36)
MCV RBC AUTO: 85.3 FL — SIGNIFICANT CHANGE UP (ref 80–100)
MONOCYTES # BLD AUTO: 0.64 K/UL — SIGNIFICANT CHANGE UP (ref 0–0.9)
MONOCYTES NFR BLD AUTO: 6.7 % — SIGNIFICANT CHANGE UP (ref 2–14)
NEUTROPHILS # BLD AUTO: 3.82 K/UL — SIGNIFICANT CHANGE UP (ref 1.8–7.4)
NEUTROPHILS NFR BLD AUTO: 39.7 % — LOW (ref 43–77)
NRBC # BLD: 0 /100 WBCS — SIGNIFICANT CHANGE UP (ref 0–0)
PLATELET # BLD AUTO: 326 K/UL — SIGNIFICANT CHANGE UP (ref 150–400)
RBC # BLD: 4.15 M/UL — SIGNIFICANT CHANGE UP (ref 3.8–5.2)
RBC # FLD: 13.8 % — SIGNIFICANT CHANGE UP (ref 10.3–14.5)
WBC # BLD: 9.62 K/UL — SIGNIFICANT CHANGE UP (ref 3.8–10.5)
WBC # FLD AUTO: 9.62 K/UL — SIGNIFICANT CHANGE UP (ref 3.8–10.5)

## 2024-06-12 PROCEDURE — 99284 EMERGENCY DEPT VISIT MOD MDM: CPT

## 2024-06-12 PROCEDURE — 70450 CT HEAD/BRAIN W/O DYE: CPT | Mod: 26,MC

## 2024-06-12 RX ORDER — SODIUM CHLORIDE 9 MG/ML
1000 INJECTION INTRAMUSCULAR; INTRAVENOUS; SUBCUTANEOUS ONCE
Refills: 0 | Status: COMPLETED | OUTPATIENT
Start: 2024-06-12 | End: 2024-06-12

## 2024-06-12 RX ORDER — METOCLOPRAMIDE HCL 10 MG
10 TABLET ORAL ONCE
Refills: 0 | Status: COMPLETED | OUTPATIENT
Start: 2024-06-12 | End: 2024-06-12

## 2024-06-12 RX ADMIN — SODIUM CHLORIDE 1000 MILLILITER(S): 9 INJECTION INTRAMUSCULAR; INTRAVENOUS; SUBCUTANEOUS at 23:30

## 2024-06-12 RX ADMIN — Medication 10 MILLIGRAM(S): at 23:30

## 2024-06-12 NOTE — ED PROVIDER NOTE - CARE PLAN
1 Principal Discharge DX:	Tension headache   Principal Discharge DX:	Tension headache  Secondary Diagnosis:	Subconjunctival hemorrhage, left

## 2024-06-12 NOTE — ED ADULT NURSE NOTE - CHIEF COMPLAINT QUOTE
"I never had a headache" complaining of "worst" headache, pain blood shot on the left eye started 6 pm feeling nausea, light headed as well. "its hard to see my eyes feels wants to close" .took tylenol 1000mg. seen at  advised to come to ED.

## 2024-06-12 NOTE — ED PROVIDER NOTE - PROGRESS NOTE DETAILS
Results reported to patient--grossly benign, labs and CT brain imaging unremarkable   Pt. reports feeling better after meds, comfortable going home   pt. agrees to f/u with primary care outpt., neuro and ophtho referrals given for f/u   pt. understands to return to ED if symptoms worsen; will d/c with tylenol prn

## 2024-06-12 NOTE — ED PROVIDER NOTE - CLINICAL SUMMARY MEDICAL DECISION MAKING FREE TEXT BOX
60 yo F with "worst headache ever," concerning for mass/bleed, also possible tension headache, also subconjunctival hemorrhage of L lateral eye without change in vision  -CT brain, cbc, cmp, hcg, iv, hydration bolus, reglan for ?migraine, possible toradol IV pending results of CT first  -f/u results, reeval

## 2024-06-12 NOTE — ED ADULT NURSE NOTE - OBJECTIVE STATEMENT
Pt A&Ox4, ambulatory with steady gait, presents to ED for medical eval. Pt endorsing headache since 1800 tonight with associated nausea and lightheadedness. Of note, pt also has new L subconjunctival hemorrhage. Denies fall or inciting event. Denies cp, sob, numbness, tingling, unilateral weakness, loss of balance, vomiting, f/c, diarrhea. PMH asthma. NKDA. Of note, pt took tylenol at 1800 with partial relief. Pt endorsing headache 6/10 at this time. VSS, nad noted. NIHSS 0. seen at  advised to come to ED.

## 2024-06-12 NOTE — ED PROVIDER NOTE - NSFOLLOWUPCLINICS_GEN_ALL_ED_FT
[facility] - Ophthalmology  Ophthalmology  [address], [address]  [place] [address]  Phone: [phone]  Fax:   Follow Up Time: Urgent

## 2024-06-12 NOTE — ED PROVIDER NOTE - PHYSICAL EXAMINATION
Vitals: WNL  Gen: AAOx3, NAD, sitting comfortably in stretcher, calm, non-toxic, responsive to questions, answering appropriately   Head: ncat, perrla, eomi b/l  Neck: supple, no lymphadenopathy, no midline deviation  Heart: rrr, no m/r/g  Lungs: CTA b/l, no rales/ronchi/wheezes  Abd: soft, nontender, non-distended, no rebound or guarding  Ext: no clubbing/cyanosis/edema  Neuro: sensation and muscle strength intact b/l, steady gait, CN2-12 intact b/l, no focal weakness or sensory loss Vitals: WNL  Gen: AAOx3, NAD, sitting comfortably in stretcher, calm, non-toxic, responsive to questions, answering appropriately   Head: ncat, perrla, eomi b/l, sub cm subconjunctival hemorrhage of L lateral eye, normal vision b/l   Neck: supple, no lymphadenopathy, no midline deviation  Heart: rrr, no m/r/g  Lungs: CTA b/l, no rales/ronchi/wheezes  Abd: soft, nontender, non-distended, no rebound or guarding  Ext: no clubbing/cyanosis/edema  Neuro: sensation and muscle strength intact b/l, steady gait, CN2-12 intact b/l, no focal weakness or sensory loss

## 2024-06-12 NOTE — ED ADULT TRIAGE NOTE - CHIEF COMPLAINT QUOTE
"I never had a headache" complaining of "worst" headache, pain blood shot on the left eye started 6 pm feeling nausea, light headed as well. took tylenol 1000mg. seen at  advised to come to ED. "I never had a headache" complaining of "worst" headache, pain blood shot on the left eye started 6 pm feeling nausea, light headed as well. "its hard to see my eyes feels wants to close" .took tylenol 1000mg. seen at  advised to come to ED.

## 2024-06-12 NOTE — ED PROVIDER NOTE - PATIENT PORTAL LINK FT
You can access the FollowMyHealth Patient Portal offered by [facility] by registering at the following website: [email or web address]. By joining [facility]’s FollowMyHealth portal, you will also be able to view your health information using other applications (apps) compatible with our system.

## 2024-06-12 NOTE — ED PROVIDER NOTE - CARE PROVIDER_API CALL
Sowmya Orona  Neurology  [address] [place] [address]  Phone: [phone]  Fax: [phone]  Follow Up Time: Urgent

## 2024-06-12 NOTE — ED PROVIDER NOTE - OBJECTIVE STATEMENT
60 yo F with headache for 5 hours, improved now slightly after taking 1,000 mg of tylenol.  Pt. says she never has headaches, and this is the worst one she's ever had.  She also noted subconjunctival hemorrhage to lateral L eye tonight.  No other complaints.  Pt. denies neurological symptoms including change in speech, weakness, numbness.   ROS: negative for fever, cough, chest pain, shortness of breath, abd pain, nausea, vomiting, diarrhea, rash, paresthesia, and weakness--all other systems reviewed are negative.   PMH: Denies; Meds: Denies; SH: Denies smoking/drinking/drug use

## 2024-06-13 VITALS
TEMPERATURE: 98 F | SYSTOLIC BLOOD PRESSURE: 124 MMHG | HEART RATE: 67 BPM | RESPIRATION RATE: 17 BRPM | OXYGEN SATURATION: 99 % | DIASTOLIC BLOOD PRESSURE: 79 MMHG

## 2024-06-13 LAB
ALBUMIN SERPL ELPH-MCNC: 3.3 G/DL — SIGNIFICANT CHANGE UP (ref 3.3–5)
ALP SERPL-CCNC: 113 U/L — SIGNIFICANT CHANGE UP (ref 40–120)
ALT FLD-CCNC: 21 U/L — SIGNIFICANT CHANGE UP (ref 12–78)
ANION GAP SERPL CALC-SCNC: 5 MMOL/L — SIGNIFICANT CHANGE UP (ref 5–17)
AST SERPL-CCNC: 12 U/L — LOW (ref 15–37)
BILIRUB SERPL-MCNC: 0.2 MG/DL — SIGNIFICANT CHANGE UP (ref 0.2–1.2)
BUN SERPL-MCNC: 16 MG/DL — SIGNIFICANT CHANGE UP (ref 7–23)
CALCIUM SERPL-MCNC: 8.9 MG/DL — SIGNIFICANT CHANGE UP (ref 8.5–10.1)
CHLORIDE SERPL-SCNC: 109 MMOL/L — HIGH (ref 96–108)
CO2 SERPL-SCNC: 27 MMOL/L — SIGNIFICANT CHANGE UP (ref 22–31)
CREAT SERPL-MCNC: 0.77 MG/DL — SIGNIFICANT CHANGE UP (ref 0.5–1.3)
EGFR: 88 ML/MIN/1.73M2 — SIGNIFICANT CHANGE UP
GLUCOSE SERPL-MCNC: 123 MG/DL — HIGH (ref 70–99)
HCG SERPL-ACNC: 3 MIU/ML — SIGNIFICANT CHANGE UP
POTASSIUM SERPL-MCNC: 3.6 MMOL/L — SIGNIFICANT CHANGE UP (ref 3.5–5.3)
POTASSIUM SERPL-SCNC: 3.6 MMOL/L — SIGNIFICANT CHANGE UP (ref 3.5–5.3)
PROT SERPL-MCNC: 6.8 GM/DL — SIGNIFICANT CHANGE UP (ref 6–8.3)
SODIUM SERPL-SCNC: 141 MMOL/L — SIGNIFICANT CHANGE UP (ref 135–145)

## 2024-06-13 RX ORDER — ACETAMINOPHEN 500 MG
2 TABLET ORAL
Qty: 40 | Refills: 0
Start: 2024-06-13 | End: 2024-06-17

## 2024-06-13 RX ADMIN — SODIUM CHLORIDE 1000 MILLILITER(S): 9 INJECTION INTRAMUSCULAR; INTRAVENOUS; SUBCUTANEOUS at 01:16

## 2024-06-13 NOTE — ED ADULT NURSE REASSESSMENT NOTE - NS ED NURSE REASSESS COMMENT FT1
Covering primary RN, Martina. Pt states she feels better after medications. Respirations equal and unlabored. No acute distress noted at this time.

## 2024-06-17 DIAGNOSIS — M75.42 IMPINGEMENT SYNDROME OF LEFT SHOULDER: ICD-10-CM

## 2024-06-17 RX ORDER — KETOROLAC TROMETHAMINE 10 MG/1
10 TABLET, FILM COATED ORAL EVERY 6 HOURS
Qty: 20 | Refills: 0 | Status: ACTIVE | COMMUNITY
Start: 2024-06-17 | End: 1900-01-01

## 2024-06-17 RX ORDER — HYDROCODONE BITARTRATE AND ACETAMINOPHEN 7.5; 325 MG/1; MG/1
7.5-325 TABLET ORAL
Qty: 42 | Refills: 0 | Status: ACTIVE | COMMUNITY
Start: 2024-06-17 | End: 1900-01-01

## 2024-06-17 RX ORDER — GABAPENTIN 300 MG/1
300 CAPSULE ORAL 3 TIMES DAILY
Qty: 15 | Refills: 0 | Status: ACTIVE | COMMUNITY
Start: 2024-06-17 | End: 1900-01-01

## 2024-06-27 ENCOUNTER — APPOINTMENT (OUTPATIENT)
Age: 59
End: 2024-06-27

## 2024-06-27 PROCEDURE — 29826 SHO ARTHRS SRG DECOMPRESSION: CPT | Mod: AS,LT

## 2024-06-27 PROCEDURE — 29823 SHO ARTHRS SRG XTNSV DBRDMT: CPT | Mod: LT

## 2024-06-27 PROCEDURE — 29828 SHO ARTHRS SRG BICP TENODSIS: CPT | Mod: LT

## 2024-06-27 PROCEDURE — 29821 SHO ARTHRS SRG COMPL SYNVCT: CPT | Mod: AS,LT

## 2024-06-27 PROCEDURE — 29821 SHO ARTHRS SRG COMPL SYNVCT: CPT | Mod: LT

## 2024-06-27 PROCEDURE — 29999 UNLISTED PX ARTHROSCOPY: CPT | Mod: AS

## 2024-06-27 PROCEDURE — 29999 UNLISTED PX ARTHROSCOPY: CPT

## 2024-06-27 PROCEDURE — 29826 SHO ARTHRS SRG DECOMPRESSION: CPT | Mod: LT

## 2024-06-27 PROCEDURE — 29827 SHO ARTHRS SRG RT8TR CUF RPR: CPT | Mod: AS,LT

## 2024-06-27 PROCEDURE — 29823 SHO ARTHRS SRG XTNSV DBRDMT: CPT | Mod: AS,LT

## 2024-06-27 PROCEDURE — 29828 SHO ARTHRS SRG BICP TENODSIS: CPT | Mod: AS,LT

## 2024-06-27 PROCEDURE — 29827 SHO ARTHRS SRG RT8TR CUF RPR: CPT | Mod: LT

## 2024-07-08 ENCOUNTER — APPOINTMENT (OUTPATIENT)
Dept: ORTHOPEDIC SURGERY | Facility: CLINIC | Age: 59
End: 2024-07-08
Payer: OTHER MISCELLANEOUS

## 2024-07-08 ENCOUNTER — APPOINTMENT (OUTPATIENT)
Dept: ORTHOPEDIC SURGERY | Facility: CLINIC | Age: 59
End: 2024-07-08

## 2024-07-08 ENCOUNTER — RESULT CHARGE (OUTPATIENT)
Age: 59
End: 2024-07-08

## 2024-07-08 VITALS — BODY MASS INDEX: 30.21 KG/M2 | HEIGHT: 61 IN | WEIGHT: 160 LBS

## 2024-07-08 VITALS — BODY MASS INDEX: 31.15 KG/M2 | HEIGHT: 61 IN | WEIGHT: 165 LBS

## 2024-07-08 DIAGNOSIS — M75.42 IMPINGEMENT SYNDROME OF LEFT SHOULDER: ICD-10-CM

## 2024-07-08 DIAGNOSIS — S46.012D STRAIN OF MUSCLE(S) AND TENDON(S) OF THE ROTATOR CUFF OF LEFT SHOULDER, SUBSEQUENT ENCOUNTER: ICD-10-CM

## 2024-07-08 PROCEDURE — 73030 X-RAY EXAM OF SHOULDER: CPT | Mod: LT

## 2024-07-08 PROCEDURE — 99024 POSTOP FOLLOW-UP VISIT: CPT

## 2024-08-05 ENCOUNTER — APPOINTMENT (OUTPATIENT)
Dept: ORTHOPEDIC SURGERY | Facility: CLINIC | Age: 59
End: 2024-08-05

## 2024-08-05 ENCOUNTER — NON-APPOINTMENT (OUTPATIENT)
Age: 59
End: 2024-08-05

## 2024-08-05 PROCEDURE — 99024 POSTOP FOLLOW-UP VISIT: CPT

## 2024-08-05 NOTE — HISTORY OF PRESENT ILLNESS
[Work related] : work related [Not working due to injury] : Work status: not working due to injury [de-identified] : Here for follow up. [] : This patient has had an injection before: no [FreeTextEntry1] : Left shoulder  [FreeTextEntry3] : 8/18/2023 [FreeTextEntry5] : PO visit #2 of left shoulder. pain is a bit better since the last visit  [de-identified] : 6/27/2023 [de-identified] : LSC   LEFT SHD SCP  GHD SYN SSC REP SAD BITD RCR

## 2024-08-05 NOTE — REASON FOR VISIT
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 The left shoulder is involved as per the [facility]TC 00 report.  DOS: 11/28/2023 Procedure: Right Shoulder Arthroscopy, Glenohumeral Debridement, Subscapularis Repair, Subacromial Decompression, Medium Supraspinatus/Infraspinatus Repair Diagnosis: Subscapularis Tear, Biceps Tear, Supraspinatus/Infraspinatus Tear, Subacromial Impingement, Shoulder Pain, Partial Anterior labral Tear, Glenohumeral Synovitis, Glenohumeral Chondrosis, SLAP Tear  DOS: 6/27/2024 Procedure: Left Shoulder Arthroscopy, Glenohumeral Debridement, Synovectomy, Subscapularis Repair Subacromial Decompression, Biceps Tenodesis, Medium Rotator Cuff Repair (DR) Diagnosis: Medium Rotator Cuff Tear, Subacromial Impingement, Partial Biceps Tear, Partial Subscapularis Tear, Shoulder Pain, Glenohumeral Synovitis, Glenohumeral Chondrosis, SLAP Tear, Partial Posterior Labral Tear  No f/c/s or PT.  She has been in her sling.

## 2024-08-05 NOTE — PHYSICAL EXAM
[Left] : left shoulder [Supine] : supine [Severe] : severe [] : no sensory deficits [FreeTextEntry9] : IR was not assessed. [de-identified] : Strength was not assessed. [TWNoteComboBox4] : passive forward flexion 90 degrees [de-identified] : external rotation 15 degrees

## 2024-08-05 NOTE — ASSESSMENT
[FreeTextEntry1] : We discussed her course.  Table slides demonstrated and advised.  Medication use discussed.  MDP is prescribed.  PT will continue.  She will follow up in 4 weeks.  Sling use discussed.  Questions answered.  Patient seen by Orlando Puga M.D. Entered by Yvonne Lay acting as scribe.

## 2024-08-09 ENCOUNTER — APPOINTMENT (OUTPATIENT)
Dept: ORTHOPEDIC SURGERY | Facility: CLINIC | Age: 59
End: 2024-08-09

## 2024-09-06 ENCOUNTER — APPOINTMENT (OUTPATIENT)
Dept: ORTHOPEDIC SURGERY | Facility: CLINIC | Age: 59
End: 2024-09-06
Payer: OTHER MISCELLANEOUS

## 2024-09-06 DIAGNOSIS — S46.011D STRAIN OF MUSCLE(S) AND TENDON(S) OF THE ROTATOR CUFF OF RIGHT SHOULDER, SUBSEQUENT ENCOUNTER: ICD-10-CM

## 2024-09-06 DIAGNOSIS — M75.42 IMPINGEMENT SYNDROME OF LEFT SHOULDER: ICD-10-CM

## 2024-09-06 PROCEDURE — 99024 POSTOP FOLLOW-UP VISIT: CPT

## 2024-09-06 NOTE — PHYSICAL EXAM
[Left] : left shoulder [Supine] : supine [Severe] : severe [] : no sensory deficits [FreeTextEntry9] : IR was not assessed. [de-identified] : Strength was not assessed. [TWNoteComboBox4] : passive forward flexion 135 degrees [de-identified] : external rotation 45 degrees

## 2024-09-06 NOTE — ASSESSMENT
[FreeTextEntry1] : We discussed her course. Continue with PT.  She will call for more pain medication.  Patient seen by Orlando Puga M.D. Entered by Rocio Taylor acting as scribe.

## 2024-09-06 NOTE — REASON FOR VISIT
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 The left shoulder is involved as per the MTC 00 report.  DOS: 11/28/2023 Procedure: Right Shoulder Arthroscopy, Glenohumeral Debridement, Subscapularis Repair, Subacromial Decompression, Medium Supraspinatus/Infraspinatus Repair Diagnosis: Subscapularis Tear, Biceps Tear, Supraspinatus/Infraspinatus Tear, Subacromial Impingement, Shoulder Pain, Partial Anterior labral Tear, Glenohumeral Synovitis, Glenohumeral Chondrosis, SLAP Tear  DOS: 6/27/2024 Procedure: Left Shoulder Arthroscopy, Glenohumeral Debridement, Synovectomy, Subscapularis Repair Subacromial Decompression, Biceps Tenodesis, Medium Rotator Cuff Repair () Diagnosis: Medium Rotator Cuff Tear, Subacromial Impingement, Partial Biceps Tear, Partial Subscapularis Tear, Shoulder Pain, Glenohumeral Synovitis, Glenohumeral Chondrosis, SLAP Tear, Partial Posterior Labral Tear  She is out of the sling. MDP helped somewhat. PT is progressing, she is making gains.

## 2024-09-06 NOTE — HISTORY OF PRESENT ILLNESS
[Work related] : work related [Not working due to injury] : Work status: not working due to injury [de-identified] : POV1- L. Shoulder. Not ready to return to work due to pain.  [] : This patient has had an injection before: no [FreeTextEntry1] : Left shoulder  [FreeTextEntry3] : 8/18/2023 [FreeTextEntry5] : PO visit #2 of left shoulder. pain is a bit better since the last visit  [de-identified] : 6/27/2023 [de-identified] : LSC   LEFT SHD SCP  GHD SYN SSC REP SAD BITD RCR

## 2024-09-11 RX ORDER — CELECOXIB 200 MG/1
200 CAPSULE ORAL
Qty: 60 | Refills: 0 | Status: ACTIVE | COMMUNITY
Start: 2024-09-11 | End: 1900-01-01

## 2024-09-20 ENCOUNTER — NON-APPOINTMENT (OUTPATIENT)
Age: 59
End: 2024-09-20

## 2024-10-04 ENCOUNTER — NON-APPOINTMENT (OUTPATIENT)
Age: 59
End: 2024-10-04

## 2024-10-04 ENCOUNTER — APPOINTMENT (OUTPATIENT)
Dept: ORTHOPEDIC SURGERY | Facility: CLINIC | Age: 59
End: 2024-10-04
Payer: OTHER MISCELLANEOUS

## 2024-10-04 DIAGNOSIS — S46.012D STRAIN OF MUSCLE(S) AND TENDON(S) OF THE ROTATOR CUFF OF LEFT SHOULDER, SUBSEQUENT ENCOUNTER: ICD-10-CM

## 2024-10-04 DIAGNOSIS — M75.42 IMPINGEMENT SYNDROME OF LEFT SHOULDER: ICD-10-CM

## 2024-10-04 DIAGNOSIS — M75.41 IMPINGEMENT SYNDROME OF RIGHT SHOULDER: ICD-10-CM

## 2024-10-04 DIAGNOSIS — S46.891D OTHER INJURY OF OTHER MUSCLES, FASCIA AND TENDONS AT SHOULDER AND UPPER ARM LEVEL, RIGHT ARM, SUBSEQUENT ENCOUNTER: ICD-10-CM

## 2024-10-04 DIAGNOSIS — S46.011D STRAIN OF MUSCLE(S) AND TENDON(S) OF THE ROTATOR CUFF OF RIGHT SHOULDER, SUBSEQUENT ENCOUNTER: ICD-10-CM

## 2024-10-04 PROCEDURE — 99214 OFFICE O/P EST MOD 30 MIN: CPT

## 2024-10-04 RX ORDER — HYDROCODONE BITARTRATE AND ACETAMINOPHEN 5; 325 MG/1; MG/1
5-325 TABLET ORAL DAILY
Qty: 10 | Refills: 0 | Status: ACTIVE | COMMUNITY
Start: 2024-10-04 | End: 1900-01-01

## 2024-10-04 RX ORDER — CELECOXIB 200 MG/1
200 CAPSULE ORAL
Qty: 60 | Refills: 0 | Status: ACTIVE | COMMUNITY
Start: 2024-10-04 | End: 1900-01-01

## 2024-10-04 NOTE — PHYSICAL EXAM
[Left] : left shoulder [Sitting] : sitting [Moderate] : moderate [Mild] : mild [] : no sensory deficits [FreeTextEntry9] : IR was not assessed. [de-identified] : Strength was not assessed. [TWNoteComboBox4] : passive forward flexion 150 degrees [de-identified] : external rotation 50 degrees

## 2024-10-04 NOTE — ASSESSMENT
[FreeTextEntry1] : PT will continue. This was requested from WC. Her questions were answered. Celebrex is again prescribed. RTW outlined, with accommodations. These accommodations would include frequent breaks. Follow up 6 weeks.  Patient seen by Dr. Orlando Puga, who determined the assessment and plan. Lillian ENRIQUEZ was present for and participated in aspects of the office encounter. IRin, am scribing for Dr. Orlando Puga in his presence for the chief complaint, physical exam, studies, assessment, and/or plan.

## 2024-10-04 NOTE — REASON FOR VISIT
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 The left shoulder is involved as per the MTC 00 report.  DOS: 11/28/2023 Procedure: Right Shoulder Arthroscopy, Glenohumeral Debridement, Subscapularis Repair, Subacromial Decompression, Medium Supraspinatus/Infraspinatus Repair Diagnosis: Subscapularis Tear, Biceps Tear, Supraspinatus/Infraspinatus Tear, Subacromial Impingement, Shoulder Pain, Partial Anterior labral Tear, Glenohumeral Synovitis, Glenohumeral Chondrosis, SLAP Tear  DOS: 6/27/2024 Procedure: Left Shoulder Arthroscopy, Glenohumeral Debridement, Synovectomy, Subscapularis Repair Subacromial Decompression, Biceps Tenodesis, Medium Rotator Cuff Repair () Diagnosis: Medium Rotator Cuff Tear, Subacromial Impingement, Partial Biceps Tear, Partial Subscapularis Tear, Shoulder Pain, Glenohumeral Synovitis, Glenohumeral Chondrosis, SLAP Tear, Partial Posterior Labral Tear  She hasn't had PT in a week or so, secondary to authorization issues.  She is doing OK.  She does her HEP.  She would like to discuss RTW LD.

## 2024-10-15 NOTE — ED PROVIDER NOTE - DISPOSITION TYPE
Medication: passed protocol.   Last office visit date: 8-29-24  Next appointment scheduled?: Yes   Number of refills given: 3      Orders pended, and routed to provider's nurse pool for review and or approval.   Please route any notes back to your nursing pool.       Thank you, Refill Center Staff     DISCHARGE

## 2024-11-18 ENCOUNTER — APPOINTMENT (OUTPATIENT)
Dept: ORTHOPEDIC SURGERY | Facility: CLINIC | Age: 59
End: 2024-11-18
Payer: OTHER MISCELLANEOUS

## 2024-11-18 ENCOUNTER — NON-APPOINTMENT (OUTPATIENT)
Age: 59
End: 2024-11-18

## 2024-11-18 VITALS — WEIGHT: 165 LBS | BODY MASS INDEX: 31.15 KG/M2 | HEIGHT: 61 IN

## 2024-11-18 DIAGNOSIS — M75.21 BICIPITAL TENDINITIS, RIGHT SHOULDER: ICD-10-CM

## 2024-11-18 DIAGNOSIS — M75.42 IMPINGEMENT SYNDROME OF LEFT SHOULDER: ICD-10-CM

## 2024-11-18 DIAGNOSIS — S46.012D STRAIN OF MUSCLE(S) AND TENDON(S) OF THE ROTATOR CUFF OF LEFT SHOULDER, SUBSEQUENT ENCOUNTER: ICD-10-CM

## 2024-11-18 DIAGNOSIS — M75.41 IMPINGEMENT SYNDROME OF RIGHT SHOULDER: ICD-10-CM

## 2024-11-18 DIAGNOSIS — S46.891D OTHER INJURY OF OTHER MUSCLES, FASCIA AND TENDONS AT SHOULDER AND UPPER ARM LEVEL, RIGHT ARM, SUBSEQUENT ENCOUNTER: ICD-10-CM

## 2024-11-18 DIAGNOSIS — S46.011D STRAIN OF MUSCLE(S) AND TENDON(S) OF THE ROTATOR CUFF OF RIGHT SHOULDER, SUBSEQUENT ENCOUNTER: ICD-10-CM

## 2024-11-18 DIAGNOSIS — S46.891A OTHER INJURY OF OTHER MUSCLES, FASCIA AND TENDONS AT SHOULDER AND UPPER ARM LEVEL, RIGHT ARM, INITIAL ENCOUNTER: ICD-10-CM

## 2024-11-18 PROCEDURE — 99214 OFFICE O/P EST MOD 30 MIN: CPT

## 2024-12-11 ENCOUNTER — NON-APPOINTMENT (OUTPATIENT)
Age: 59
End: 2024-12-11

## 2025-01-03 ENCOUNTER — APPOINTMENT (OUTPATIENT)
Dept: ORTHOPEDIC SURGERY | Facility: CLINIC | Age: 60
End: 2025-01-03
Payer: OTHER MISCELLANEOUS

## 2025-01-03 VITALS — WEIGHT: 165 LBS | HEIGHT: 61 IN | BODY MASS INDEX: 31.15 KG/M2

## 2025-01-03 DIAGNOSIS — S46.012D STRAIN OF MUSCLE(S) AND TENDON(S) OF THE ROTATOR CUFF OF LEFT SHOULDER, SUBSEQUENT ENCOUNTER: ICD-10-CM

## 2025-01-03 DIAGNOSIS — M75.21 BICIPITAL TENDINITIS, RIGHT SHOULDER: ICD-10-CM

## 2025-01-03 DIAGNOSIS — S46.891D OTHER INJURY OF OTHER MUSCLES, FASCIA AND TENDONS AT SHOULDER AND UPPER ARM LEVEL, RIGHT ARM, SUBSEQUENT ENCOUNTER: ICD-10-CM

## 2025-01-03 DIAGNOSIS — S46.891A OTHER INJURY OF OTHER MUSCLES, FASCIA AND TENDONS AT SHOULDER AND UPPER ARM LEVEL, RIGHT ARM, INITIAL ENCOUNTER: ICD-10-CM

## 2025-01-03 DIAGNOSIS — S46.011D STRAIN OF MUSCLE(S) AND TENDON(S) OF THE ROTATOR CUFF OF RIGHT SHOULDER, SUBSEQUENT ENCOUNTER: ICD-10-CM

## 2025-01-03 DIAGNOSIS — M75.41 IMPINGEMENT SYNDROME OF RIGHT SHOULDER: ICD-10-CM

## 2025-01-03 DIAGNOSIS — M75.42 IMPINGEMENT SYNDROME OF LEFT SHOULDER: ICD-10-CM

## 2025-01-03 PROCEDURE — 99214 OFFICE O/P EST MOD 30 MIN: CPT

## 2025-02-14 ENCOUNTER — APPOINTMENT (OUTPATIENT)
Dept: ORTHOPEDIC SURGERY | Facility: CLINIC | Age: 60
End: 2025-02-14
Payer: OTHER MISCELLANEOUS

## 2025-02-14 VITALS — BODY MASS INDEX: 31.15 KG/M2 | WEIGHT: 165 LBS | HEIGHT: 61 IN

## 2025-02-14 DIAGNOSIS — S46.891A OTHER INJURY OF OTHER MUSCLES, FASCIA AND TENDONS AT SHOULDER AND UPPER ARM LEVEL, RIGHT ARM, INITIAL ENCOUNTER: ICD-10-CM

## 2025-02-14 DIAGNOSIS — S46.891D OTHER INJURY OF OTHER MUSCLES, FASCIA AND TENDONS AT SHOULDER AND UPPER ARM LEVEL, RIGHT ARM, SUBSEQUENT ENCOUNTER: ICD-10-CM

## 2025-02-14 DIAGNOSIS — S46.011D STRAIN OF MUSCLE(S) AND TENDON(S) OF THE ROTATOR CUFF OF RIGHT SHOULDER, SUBSEQUENT ENCOUNTER: ICD-10-CM

## 2025-02-14 DIAGNOSIS — M75.21 BICIPITAL TENDINITIS, RIGHT SHOULDER: ICD-10-CM

## 2025-02-14 DIAGNOSIS — S46.211A STRAIN OF MUSCLE, FASCIA AND TENDON OF OTHER PARTS OF BICEPS, RIGHT ARM, INITIAL ENCOUNTER: ICD-10-CM

## 2025-02-14 DIAGNOSIS — M75.41 IMPINGEMENT SYNDROME OF RIGHT SHOULDER: ICD-10-CM

## 2025-02-14 DIAGNOSIS — S46.012D STRAIN OF MUSCLE(S) AND TENDON(S) OF THE ROTATOR CUFF OF LEFT SHOULDER, SUBSEQUENT ENCOUNTER: ICD-10-CM

## 2025-02-14 DIAGNOSIS — S46.892D OTHER INJURY OF OTHER MUSCLES, FASCIA AND TENDONS AT SHOULDER AND UPPER ARM LEVEL, LEFT ARM, SUBSEQUENT ENCOUNTER: ICD-10-CM

## 2025-02-14 DIAGNOSIS — M75.42 IMPINGEMENT SYNDROME OF LEFT SHOULDER: ICD-10-CM

## 2025-02-14 PROCEDURE — 99243 OFF/OP CNSLTJ NEW/EST LOW 30: CPT

## 2025-02-15 PROBLEM — S46.892D: Status: ACTIVE | Noted: 2025-02-15

## 2025-06-10 ENCOUNTER — APPOINTMENT (OUTPATIENT)
Dept: ORTHOPEDIC SURGERY | Facility: CLINIC | Age: 60
End: 2025-06-10

## 2025-06-20 NOTE — ED ADULT NURSE NOTE - PAIN: PRESENCE, MLM
Med:  pantoprazole (Protonix) 40 MG tablet -  Last filled on 3/20/24 for 112 tablets with 0 refills    Last seen by GI:  11/7/24    FUV:  7/21/25    Refill passes protocol. Refilled for 6 month supply     complains of pain/discomfort

## 2025-07-01 ENCOUNTER — APPOINTMENT (OUTPATIENT)
Dept: ORTHOPEDIC SURGERY | Facility: CLINIC | Age: 60
End: 2025-07-01
Payer: COMMERCIAL

## 2025-07-01 PROCEDURE — 20550 NJX 1 TENDON SHEATH/LIGAMENT: CPT | Mod: F3

## 2025-07-01 PROCEDURE — 99213 OFFICE O/P EST LOW 20 MIN: CPT | Mod: 25

## 2025-08-09 ENCOUNTER — NON-APPOINTMENT (OUTPATIENT)
Age: 60
End: 2025-08-09

## 2025-08-11 ENCOUNTER — APPOINTMENT (OUTPATIENT)
Dept: ORTHOPEDIC SURGERY | Facility: CLINIC | Age: 60
End: 2025-08-11

## 2025-08-11 VITALS — HEIGHT: 61 IN | WEIGHT: 165 LBS | BODY MASS INDEX: 31.15 KG/M2

## 2025-08-11 PROCEDURE — 99213 OFFICE O/P EST LOW 20 MIN: CPT | Mod: 25

## 2025-08-11 PROCEDURE — 73630 X-RAY EXAM OF FOOT: CPT | Mod: RT

## 2025-08-11 PROCEDURE — 28490 TREAT BIG TOE FRACTURE: CPT | Mod: RT

## 2025-08-18 ENCOUNTER — APPOINTMENT (OUTPATIENT)
Dept: ORTHOPEDIC SURGERY | Facility: CLINIC | Age: 60
End: 2025-08-18
Payer: COMMERCIAL

## 2025-08-18 ENCOUNTER — NON-APPOINTMENT (OUTPATIENT)
Age: 60
End: 2025-08-18

## 2025-08-18 DIAGNOSIS — S92.411A DISPLACED FRACTURE OF PROXIMAL PHALANX OF RIGHT GREAT TOE, INITIAL ENCOUNTER FOR CLOSED FRACTURE: ICD-10-CM

## 2025-08-18 PROCEDURE — 73660 X-RAY EXAM OF TOE(S): CPT | Mod: RT

## 2025-08-18 PROCEDURE — L4361: CPT | Mod: RT

## 2025-08-18 PROCEDURE — 99213 OFFICE O/P EST LOW 20 MIN: CPT

## 2025-09-08 ENCOUNTER — APPOINTMENT (OUTPATIENT)
Dept: ORTHOPEDIC SURGERY | Facility: CLINIC | Age: 60
End: 2025-09-08

## 2025-09-08 DIAGNOSIS — S92.411D DISPLACED FRACTURE OF PROXIMAL PHALANX OF RIGHT GREAT TOE, SUBSEQUENT ENCOUNTER FOR FRACTURE WITH ROUTINE HEALING: ICD-10-CM

## (undated) DEVICE — ELCTR WAND AMBIENT MEGA 90DRG

## (undated) DEVICE — SUT ORTHOCORD COMPOSITE

## (undated) DEVICE — DRSG TAPE MICROFOAM 4"

## (undated) DEVICE — SUT VICRYL 2-0 36" CT-1 UNDYED

## (undated) DEVICE — GLV 8 PROTEXIS (BLUE)

## (undated) DEVICE — POSITIONER S&N FACE MASK SPIDER 2

## (undated) DEVICE — DRSG STERISTRIPS 0.5 X 4"

## (undated) DEVICE — DRSG STOCKINETTE TUBULAR COTTON 3" NS

## (undated) DEVICE — CAM-ESU 7925339: Type: DURABLE MEDICAL EQUIPMENT

## (undated) DEVICE — BUR S&N STONECUTTER ELITE 4MM STRAIGHT (MAROON)

## (undated) DEVICE — DRAPE MAYO STAND 23"

## (undated) DEVICE — ELCTR GROUNDING PAD ADULT COVIDIEN

## (undated) DEVICE — ELCTR S&N SWITCHPEN WITH L-HOOK FOR FLUID

## (undated) DEVICE — TUBING DEPUY MITEK FMS INFLOW

## (undated) DEVICE — PACK SHOULDER

## (undated) DEVICE — KNIFE S&N ACUFEX BANANA SERRATED 3MM STRAIGHT

## (undated) DEVICE — SOL IRR BAG NS 0.9% 3000ML

## (undated) DEVICE — WARMING BLANKET LOWER ADULT

## (undated) DEVICE — SUT PROLENE 0 30" CT-1

## (undated) DEVICE — SHAVER BLADE S&N FULL RADIUS BONECUTTER PLATINUM 4.5MM (YELLOW)

## (undated) DEVICE — CANNULA S&N ARTHROSCOPY W OBTURATOR 8MM

## (undated) DEVICE — SYR LUER LOK 50CC

## (undated) DEVICE — DRSG CURITY GAUZE SPONGE 4 X 4" 12-PLY

## (undated) DEVICE — TUBING DEPUY MITEK FMS OUTFLOW

## (undated) DEVICE — CANNULA LINVATEC NON-FENESTRATED 8.4MM 75MM

## (undated) DEVICE — GLV 7.5 PROTEXIS (WHITE)

## (undated) DEVICE — SUT MONOCRYL 3-0 18" PS-2 UNDYED

## (undated) DEVICE — CANNULA LINVATEC SHOULDER 7CM (GREY & ORANGE)

## (undated) DEVICE — SUT PDS II 1 48" TP-1

## (undated) DEVICE — NDL SPINAL 18G X 3.5" (PINK)

## (undated) DEVICE — SUT PDS II 1 96" XLH

## (undated) DEVICE — POSITIONER PATIENT SAFETY STRAP 3X60"

## (undated) DEVICE — VENODYNE/SCD SLEEVE CALF MEDIUM